# Patient Record
Sex: MALE | Race: WHITE | NOT HISPANIC OR LATINO | Employment: FULL TIME | URBAN - METROPOLITAN AREA
[De-identification: names, ages, dates, MRNs, and addresses within clinical notes are randomized per-mention and may not be internally consistent; named-entity substitution may affect disease eponyms.]

---

## 2017-01-27 RX ORDER — DESVENLAFAXINE 100 MG/1
TABLET, EXTENDED RELEASE ORAL
Qty: 30 TABLET | Refills: 0 | Status: SHIPPED | OUTPATIENT
Start: 2017-01-27 | End: 2017-02-26 | Stop reason: SDUPTHER

## 2017-01-27 RX ORDER — DESVENLAFAXINE SUCCINATE 100 MG/1
TABLET, EXTENDED RELEASE ORAL
Qty: 30 TABLET | Refills: 0 | OUTPATIENT
Start: 2017-01-27

## 2017-02-21 RX ORDER — ATORVASTATIN CALCIUM 10 MG/1
TABLET, FILM COATED ORAL
Qty: 30 TABLET | Refills: 0 | OUTPATIENT
Start: 2017-02-21

## 2017-02-27 RX ORDER — DESVENLAFAXINE SUCCINATE 100 MG/1
TABLET, EXTENDED RELEASE ORAL
Qty: 30 TABLET | Refills: 0 | Status: SHIPPED | OUTPATIENT
Start: 2017-02-27 | End: 2017-03-17 | Stop reason: SDUPTHER

## 2017-03-08 ENCOUNTER — TELEPHONE (OUTPATIENT)
Dept: INTERNAL MEDICINE | Facility: CLINIC | Age: 52
End: 2017-03-08

## 2017-03-08 RX ORDER — ATORVASTATIN CALCIUM 10 MG/1
TABLET, FILM COATED ORAL
Qty: 30 TABLET | Refills: 0 | Status: SHIPPED | OUTPATIENT
Start: 2017-03-08 | End: 2017-03-17 | Stop reason: SDUPTHER

## 2017-03-08 NOTE — TELEPHONE ENCOUNTER
Called pt and schedule a f/u b/c he hasn't been seen in almost 1 year. Pt schedule for 3-17. 30 day supply sent to [facility].

## 2017-03-17 ENCOUNTER — OFFICE VISIT (OUTPATIENT)
Dept: INTERNAL MEDICINE | Facility: CLINIC | Age: 52
End: 2017-03-17

## 2017-03-17 VITALS
RESPIRATION RATE: 18 BRPM | HEART RATE: 86 BPM | DIASTOLIC BLOOD PRESSURE: 78 MMHG | BODY MASS INDEX: 44.77 KG/M2 | SYSTOLIC BLOOD PRESSURE: 118 MMHG | WEIGHT: 312 LBS | TEMPERATURE: 97.3 F

## 2017-03-17 DIAGNOSIS — Z11.59 NEED FOR HEPATITIS C SCREENING TEST: ICD-10-CM

## 2017-03-17 DIAGNOSIS — K21.9 GASTROESOPHAGEAL REFLUX DISEASE WITHOUT ESOPHAGITIS: ICD-10-CM

## 2017-03-17 DIAGNOSIS — E78.5 HYPERLIPIDEMIA, UNSPECIFIED HYPERLIPIDEMIA TYPE: Primary | ICD-10-CM

## 2017-03-17 DIAGNOSIS — F32.A DEPRESSION, UNSPECIFIED DEPRESSION TYPE: ICD-10-CM

## 2017-03-17 DIAGNOSIS — Z13.220 LIPID SCREENING: ICD-10-CM

## 2017-03-17 LAB
ARTICHOKE IGE QN: 91 MG/DL (ref 0–130)
CHOLEST SERPL-MCNC: 188 MG/DL (ref 0–200)
HCV AB SER DONR QL: NORMAL
HDLC SERPL-MCNC: 36 MG/DL (ref 40–60)
TRIGL SERPL-MCNC: 439 MG/DL (ref 0–150)

## 2017-03-17 PROCEDURE — 36415 COLL VENOUS BLD VENIPUNCTURE: CPT | Performed by: INTERNAL MEDICINE

## 2017-03-17 PROCEDURE — 99214 OFFICE O/P EST MOD 30 MIN: CPT | Performed by: INTERNAL MEDICINE

## 2017-03-17 PROCEDURE — 86803 HEPATITIS C AB TEST: CPT | Performed by: INTERNAL MEDICINE

## 2017-03-17 PROCEDURE — 80061 LIPID PANEL: CPT | Performed by: INTERNAL MEDICINE

## 2017-03-17 RX ORDER — ARMODAFINIL 150 MG/1
TABLET ORAL
Refills: 5 | COMMUNITY
Start: 2017-02-04 | End: 2017-03-17 | Stop reason: SDUPTHER

## 2017-03-17 RX ORDER — PANTOPRAZOLE SODIUM 40 MG/1
40 TABLET, DELAYED RELEASE ORAL DAILY
Qty: 30 TABLET | Refills: 8 | Status: SHIPPED | OUTPATIENT
Start: 2017-03-17 | End: 2017-12-27 | Stop reason: SDUPTHER

## 2017-03-17 RX ORDER — DESVENLAFAXINE 100 MG/1
100 TABLET, EXTENDED RELEASE ORAL DAILY
Qty: 30 TABLET | Refills: 8 | Status: SHIPPED | OUTPATIENT
Start: 2017-03-17 | End: 2017-12-27 | Stop reason: SDUPTHER

## 2017-03-17 RX ORDER — ARMODAFINIL 150 MG/1
TABLET ORAL
Qty: 30 TABLET | Refills: 5 | Status: SHIPPED | OUTPATIENT
Start: 2017-03-17 | End: 2017-09-06 | Stop reason: SDUPTHER

## 2017-03-17 RX ORDER — ATORVASTATIN CALCIUM 10 MG/1
TABLET, FILM COATED ORAL
Qty: 30 TABLET | Refills: 8 | Status: SHIPPED | OUTPATIENT
Start: 2017-03-17 | End: 2017-12-27 | Stop reason: SDUPTHER

## 2017-03-17 NOTE — PROGRESS NOTES
Subjective   Hasmukh Leon is a 51 y.o. male.     History of Present Illness     1 hyperlipidemia- chronic and doing well.  Patient says that he has not had any side effects while taking the medication Lipitor.  Patient denies any abdominal pain, jaundice, muscle aches, fatigue, constipation, or any other systemic symptoms.  Diet: Patient has been trying to make some improvements with his diet by eating low fried food, low greasy food, increase fiber.    2 Depression - chronic and controlled.  Patient says that he has been doing very well on the Pristiq 100 mg by mouth once a day.  Patient denies any suicide ideations, emotional liability of threats towards himself or anybody else, no decreased mood, no hallucinations, no other concerns to address at this time.    3 GERD-chronic and controlled.  Patient says that he has been on the Protonix for at least 10+ years.  He says that this is the only thing that helps controlling his reflux and currently he has not had any exacerbations of any reflux issues or symptoms of any nausea, vomiting, diarrhea, anorexia, constipation, or any other systemic symptoms.    Review of Systems   All other systems reviewed and are negative.      Objective   Physical Exam   Constitutional: He appears well-developed and well-nourished.   HENT:   Head: Normocephalic.   Right Ear: External ear normal.   Left Ear: External ear normal.   Nose: Nose normal.   Mouth/Throat: Oropharynx is clear and moist.   Eyes: Conjunctivae and EOM are normal. Pupils are equal, round, and reactive to light.   Neck: Normal range of motion. Neck supple.   Cardiovascular: Normal rate, regular rhythm, normal heart sounds and intact distal pulses.    Pulmonary/Chest: Effort normal and breath sounds normal.   Abdominal: Soft. Bowel sounds are normal.   Musculoskeletal: Normal range of motion.   Neurological: He is alert. He has normal reflexes.   Skin: Skin is warm.   Nursing note and vitals  reviewed.      Assessment/Plan   Hasmukh was seen today for hyperlipidemia, depression and heartburn.    Diagnoses and all orders for this visit:    Hyperlipidemia, unspecified hyperlipidemia type  -     atorvastatin (LIPITOR) 10 MG tablet; TAKE 1 TABLET DAILY.    Depression, unspecified depression type-continue on the Pristiq    Gastroesophageal reflux disease without esophagitis  -     pantoprazole (PROTONIX) 40 MG EC tablet; Take 1 tablet by mouth Daily.        Anticipatory guidance: I did discuss with patient the current medical concerns with PPI medications and kidney disease as well as dementia.  Patients questions were thoroughly answered and he says that the benefit outweighs risk and he would like to continue on the Protonix for his reflux    Need for hepatitis C screening test-hepatitis C antibody screening    Lipid screening    Other orders  -     desvenlafaxine (PRISTIQ) 100 MG 24 hr tablet; Take 1 tablet by mouth Daily.  -     Armodafinil 150 MG tablet; Take one tablet by mouth once a day.

## 2017-03-30 RX ORDER — DESVENLAFAXINE SUCCINATE 100 MG/1
TABLET, EXTENDED RELEASE ORAL
Qty: 30 TABLET | Refills: 0 | Status: SHIPPED | OUTPATIENT
Start: 2017-03-30 | End: 2017-06-22

## 2017-06-22 ENCOUNTER — OFFICE VISIT (OUTPATIENT)
Dept: INTERNAL MEDICINE | Facility: CLINIC | Age: 52
End: 2017-06-22

## 2017-06-22 VITALS
TEMPERATURE: 97.8 F | BODY MASS INDEX: 44.62 KG/M2 | RESPIRATION RATE: 21 BRPM | DIASTOLIC BLOOD PRESSURE: 76 MMHG | HEART RATE: 82 BPM | WEIGHT: 311 LBS | SYSTOLIC BLOOD PRESSURE: 112 MMHG

## 2017-06-22 DIAGNOSIS — Z00.00 WELL ADULT EXAM: ICD-10-CM

## 2017-06-22 DIAGNOSIS — E66.01 MORBID OBESITY DUE TO EXCESS CALORIES (HCC): ICD-10-CM

## 2017-06-22 DIAGNOSIS — M25.521 ELBOW PAIN, RIGHT: Primary | ICD-10-CM

## 2017-06-22 DIAGNOSIS — Z12.5 SCREENING PSA (PROSTATE SPECIFIC ANTIGEN): ICD-10-CM

## 2017-06-22 DIAGNOSIS — Z13.220 LIPID SCREENING: ICD-10-CM

## 2017-06-22 LAB
ALBUMIN SERPL-MCNC: 4.7 G/DL (ref 3.2–4.8)
ALBUMIN/GLOB SERPL: 1.7 G/DL (ref 1.5–2.5)
ALP SERPL-CCNC: 89 U/L (ref 25–100)
ALT SERPL W P-5'-P-CCNC: 36 U/L (ref 7–40)
ANION GAP SERPL CALCULATED.3IONS-SCNC: 3 MMOL/L (ref 3–11)
ARTICHOKE IGE QN: 95 MG/DL (ref 0–130)
AST SERPL-CCNC: 27 U/L (ref 0–33)
BILIRUB SERPL-MCNC: 0.5 MG/DL (ref 0.3–1.2)
BUN BLD-MCNC: 15 MG/DL (ref 9–23)
BUN/CREAT SERPL: 15 (ref 7–25)
CALCIUM SPEC-SCNC: 10 MG/DL (ref 8.7–10.4)
CHLORIDE SERPL-SCNC: 101 MMOL/L (ref 99–109)
CHOLEST SERPL-MCNC: 194 MG/DL (ref 0–200)
CO2 SERPL-SCNC: 35 MMOL/L (ref 20–31)
CREAT BLD-MCNC: 1 MG/DL (ref 0.6–1.3)
DEPRECATED RDW RBC AUTO: 42.7 FL (ref 37–54)
ERYTHROCYTE [DISTWIDTH] IN BLOOD BY AUTOMATED COUNT: 13 % (ref 11.3–14.5)
GFR SERPL CREATININE-BSD FRML MDRD: 79 ML/MIN/1.73
GLOBULIN UR ELPH-MCNC: 2.8 GM/DL
GLUCOSE BLD-MCNC: 107 MG/DL (ref 70–100)
HCT VFR BLD AUTO: 46.9 % (ref 38.9–50.9)
HDLC SERPL-MCNC: 37 MG/DL (ref 40–60)
HGB BLD-MCNC: 15.1 G/DL (ref 13.1–17.5)
MCH RBC QN AUTO: 28.9 PG (ref 27–31)
MCHC RBC AUTO-ENTMCNC: 32.2 G/DL (ref 32–36)
MCV RBC AUTO: 89.8 FL (ref 80–99)
PLATELET # BLD AUTO: 300 10*3/MM3 (ref 150–450)
PMV BLD AUTO: 9.7 FL (ref 6–12)
POTASSIUM BLD-SCNC: 4.4 MMOL/L (ref 3.5–5.5)
PROT SERPL-MCNC: 7.5 G/DL (ref 5.7–8.2)
PSA SERPL-MCNC: 0.33 NG/ML (ref 0–4)
RBC # BLD AUTO: 5.22 10*6/MM3 (ref 4.2–5.76)
SODIUM BLD-SCNC: 139 MMOL/L (ref 132–146)
T4 FREE SERPL-MCNC: 1.05 NG/DL (ref 0.89–1.76)
TRIGL SERPL-MCNC: 282 MG/DL (ref 0–150)
TSH SERPL DL<=0.05 MIU/L-ACNC: 2.08 MIU/ML (ref 0.35–5.35)
WBC NRBC COR # BLD: 5.78 10*3/MM3 (ref 3.5–10.8)

## 2017-06-22 PROCEDURE — 80061 LIPID PANEL: CPT | Performed by: INTERNAL MEDICINE

## 2017-06-22 PROCEDURE — 99396 PREV VISIT EST AGE 40-64: CPT | Performed by: INTERNAL MEDICINE

## 2017-06-22 PROCEDURE — 85027 COMPLETE CBC AUTOMATED: CPT | Performed by: INTERNAL MEDICINE

## 2017-06-22 PROCEDURE — 36415 COLL VENOUS BLD VENIPUNCTURE: CPT | Performed by: INTERNAL MEDICINE

## 2017-06-22 PROCEDURE — 80053 COMPREHEN METABOLIC PANEL: CPT | Performed by: INTERNAL MEDICINE

## 2017-06-22 PROCEDURE — 84153 ASSAY OF PSA TOTAL: CPT | Performed by: INTERNAL MEDICINE

## 2017-06-22 PROCEDURE — 84443 ASSAY THYROID STIM HORMONE: CPT | Performed by: INTERNAL MEDICINE

## 2017-06-22 PROCEDURE — 84439 ASSAY OF FREE THYROXINE: CPT | Performed by: INTERNAL MEDICINE

## 2017-06-22 NOTE — PROGRESS NOTES
Subjective   Hasmukh Leon is a 51 y.o. male.     History of Present Illness     Complete Physical   1 Right elbow- mild pain  Duration  Sx: Mild pain upon minimal manipulation, worse with flexion or extension.  Patient does not recall any particular injury to the right elbow and he says it does not interfere with his activities of daily living and he does not actually have to take any medications for.     2 obesity/weight-patient had questions concerns about diet and exercise and wanted to lose weight    Diet: Poor diet    Exercise low to minimal     Social history: No EtOH consumption, no cigarette smoking, no IV drug use or marijuana.       Review of Systems   All other systems reviewed and are negative.      Objective   Physical Exam   Constitutional: He is oriented to person, place, and time. He appears well-developed and well-nourished.   HENT:   Head: Normocephalic.   Right Ear: External ear normal.   Left Ear: External ear normal.   Nose: Nose normal.   Mouth/Throat: Oropharynx is clear and moist.   Eyes: Conjunctivae and EOM are normal. Pupils are equal, round, and reactive to light.   Neck: Normal range of motion. Neck supple.   Cardiovascular: Normal rate, regular rhythm, normal heart sounds and intact distal pulses.    Pulmonary/Chest: Effort normal and breath sounds normal.   Abdominal: Soft. Bowel sounds are normal.   Genitourinary:   Genitourinary Comments: Patient deferred on digital rectal exam   Musculoskeletal: Normal range of motion. He exhibits tenderness.   Minimal tenderness around the right elbow with flexion and extension   Neurological: He is alert and oriented to person, place, and time. He has normal reflexes.   Skin: Skin is warm.   Psychiatric: He has a normal mood and affect. His behavior is normal. Thought content normal.   Nursing note and vitals reviewed.      Assessment/Plan   Hasmukh was seen today for annual exam and elbow pain.    Diagnoses and all orders for this  visit:    Elbow pain, right-symptoms are consistent with a mild tendinitis.  Recommend passive range of motion exercises, NSAIDs, and considerations for physical therapy.  Patient will continue observation at this time.    Well adult exam  -     CBC (No Diff)  -     Comprehensive Metabolic Panel  -     T4, Free  -     TSH    Lipid screening  -     Lipid Panel    Screening PSA (prostate specific antigen)  -     PSA    Morbid obesity due to excess calories  -     Ambulatory Referral to Nutrition Services    Recommendations:  Appropriate follow-up with ophthalmology for annual eye exam.  Recommend appropriate follow-up with dentist.  Encourage other exercise modalities to help with calorie count

## 2017-06-27 ENCOUNTER — TELEPHONE (OUTPATIENT)
Dept: INTERNAL MEDICINE | Facility: CLINIC | Age: 52
End: 2017-06-27

## 2017-07-21 ENCOUNTER — HOSPITAL ENCOUNTER (OUTPATIENT)
Dept: NUTRITION | Facility: HOSPITAL | Age: 52
Setting detail: RECURRING SERIES
End: 2017-07-21

## 2017-08-07 ENCOUNTER — HOSPITAL ENCOUNTER (OUTPATIENT)
Dept: NUTRITION | Facility: HOSPITAL | Age: 52
Setting detail: RECURRING SERIES
Discharge: HOME | End: 2017-08-07
Attending: INTERNAL MEDICINE

## 2017-08-07 VITALS — WEIGHT: 312.8 LBS | BODY MASS INDEX: 44.78 KG/M2 | HEIGHT: 70 IN

## 2017-08-07 PROCEDURE — 97802 MEDICAL NUTRITION INDIV IN: CPT | Performed by: DIETITIAN, REGISTERED

## 2017-09-06 ENCOUNTER — OFFICE VISIT (OUTPATIENT)
Dept: NEUROLOGY | Facility: CLINIC | Age: 52
End: 2017-09-06

## 2017-09-06 VITALS
SYSTOLIC BLOOD PRESSURE: 136 MMHG | WEIGHT: 315 LBS | DIASTOLIC BLOOD PRESSURE: 84 MMHG | OXYGEN SATURATION: 98 % | BODY MASS INDEX: 45.1 KG/M2 | HEART RATE: 110 BPM | HEIGHT: 70 IN

## 2017-09-06 DIAGNOSIS — G47.33 OSA (OBSTRUCTIVE SLEEP APNEA): Primary | ICD-10-CM

## 2017-09-06 PROCEDURE — 99212 OFFICE O/P EST SF 10 MIN: CPT | Performed by: PSYCHIATRY & NEUROLOGY

## 2017-09-06 RX ORDER — ARMODAFINIL 200 MG/1
TABLET ORAL
Qty: 30 TABLET | Refills: 4 | Status: SHIPPED | OUTPATIENT
Start: 2017-09-06 | End: 2017-10-02 | Stop reason: CLARIF

## 2017-09-06 NOTE — PROGRESS NOTES
"Subjective:     Patient ID: Hasmukh Leon is a 51 y.o. male.    History of Present Illness     51 y.o.  male with AMRITA with EDS returns in follow up.  Last visit on 9/7/16 continued Nuvigil and renewed mask and supplies.    DME:  [facility]    Wt gain of 40 lbs.  Uses Nuvigil on a daily basis and improves daytime sleepiness.    Using CPAP on a daily basis over 7 hours a night.  Persistent daytime fatigue if not using Nuvigil.    Changing mask and supplies on scheduled intervals.    The following portions of the patient's history were reviewed and updated as appropriate: allergies, current medications, past medical history, past surgical history and problem list.    Review of Systems   Constitutional: Positive for fatigue. Negative for activity change and unexpected weight change.   HENT: Negative for facial swelling, hearing loss, tinnitus, trouble swallowing and voice change.    Eyes: Negative for photophobia, pain and visual disturbance.   Respiratory: Negative for apnea, cough and choking.    Cardiovascular: Negative for chest pain.   Gastrointestinal: Negative for constipation, nausea and vomiting.   Endocrine: Negative for cold intolerance.   Genitourinary: Negative for difficulty urinating, frequency and urgency.   Musculoskeletal: Negative for arthralgias, back pain, gait problem, myalgias, neck pain and neck stiffness.   Skin: Negative for rash.   Allergic/Immunologic: Negative for immunocompromised state.   Neurological: Negative for dizziness, tremors, seizures, syncope, facial asymmetry, speech difficulty, weakness, light-headedness, numbness and headaches.   Hematological: Negative for adenopathy.   Psychiatric/Behavioral: Positive for sleep disturbance. Negative for confusion, decreased concentration and hallucinations. The patient is not nervous/anxious.         Objective:  Vitals:    09/06/17 1456   BP: 136/84   Pulse: 110   SpO2: 98%   Weight: (!) 319 lb (145 kg)   Height: 70\" (177.8 cm) "       Neurologic Exam     Mental Status   Attention: normal. Concentration: normal.   Level of consciousness: alert  Knowledge: good and consistent with education.   Normal comprehension.     Cranial Nerves     CN II   Visual fields full to confrontation.   Visual acuity: normal  Right visual field deficit: none  Left visual field deficit: none     CN III, IV, VI   Nystagmus: none   Diplopia: none  Ophthalmoparesis: none  Upgaze: normal  Downgaze: normal  Conjugate gaze: present    CN V   Facial sensation intact.   Right corneal reflex: normal  Left corneal reflex: normal    CN VII   Right facial weakness: none  Left facial weakness: none    CN VIII   Hearing: intact    CN IX, X   Palate: symmetric  Right gag reflex: normal  Left gag reflex: normal    CN XI   Right sternocleidomastoid strength: normal  Left sternocleidomastoid strength: normal    CN XII   Tongue: not atrophic  Fasciculations: absent  Tongue deviation: none    Motor Exam   Muscle bulk: normal  Overall muscle tone: normal  Right arm tone: normal  Left arm tone: normal  Right leg tone: normal  Left leg tone: normal    Sensory Exam   Light touch normal.     Gait, Coordination, and Reflexes     Tremor   Resting tremor: absent  Intention tremor: absent  Action tremor: absent    Reflexes   Reflexes 2+ except as noted.       Physical Exam   Constitutional: He appears well-developed and well-nourished.   Nursing note and vitals reviewed.      Assessment/Plan:       Problems Addressed this Visit        Respiratory    AMRITA (obstructive sleep apnea) - Primary     Continue CPAP   Renew Nuvigil 200 mg qday

## 2017-09-13 ENCOUNTER — TELEPHONE (OUTPATIENT)
Dept: NUTRITION | Facility: HOSPITAL | Age: 52
End: 2017-09-13

## 2017-09-13 NOTE — PROGRESS NOTES
"Spoke with patient on his progress since initial nutrition appointment with RD. Patient states he is doing better with balancing his meals and using the plate method as a guide. Also has been working out a bit more. He has not weighed since the initial appointment but states \"my clothes fit a little better\". Says he has focused a lot of his attention on portion sizes and snacking- he has been eating apples and carrots as snacks in the evening instead of fig newtons. Describes how it is more a matter of emotional eating and just needed something to munch on. States \"I'm making smarter choices\" and that they are \"small, but positive changes\". Plans to continue with these nutrition changes and the exercise he has started. Denies having any questions or concerns as this time.     Goal completion:  1. Portion control: 100%  2. Follow plate method: 50%  3. Lose weight: 100% per patient unknown weight    Has RD contact information, encouraged him to call as needed. Thank you for this referral.   "

## 2017-09-15 ENCOUNTER — HOSPITAL ENCOUNTER (OUTPATIENT)
Dept: NUTRITION | Facility: HOSPITAL | Age: 52
Setting detail: RECURRING SERIES
End: 2017-09-15
Attending: INTERNAL MEDICINE

## 2017-09-25 ENCOUNTER — TELEPHONE (OUTPATIENT)
Dept: NEUROLOGY | Facility: CLINIC | Age: 52
End: 2017-09-25

## 2017-09-25 NOTE — TELEPHONE ENCOUNTER
----- Message from Gabrielle Jefferson MA sent at 9/25/2017  1:27 PM EDT -----  Regarding: RX   Pt needs script for CPAP supplies faxed to [phone].

## 2017-10-02 RX ORDER — MODAFINIL 200 MG/1
200 TABLET ORAL DAILY
Qty: 30 TABLET | Refills: 5 | OUTPATIENT
Start: 2017-10-02 | End: 2018-04-02 | Stop reason: SDUPTHER

## 2017-12-27 DIAGNOSIS — K21.9 GASTROESOPHAGEAL REFLUX DISEASE WITHOUT ESOPHAGITIS: ICD-10-CM

## 2017-12-27 DIAGNOSIS — E78.5 HYPERLIPIDEMIA, UNSPECIFIED HYPERLIPIDEMIA TYPE: ICD-10-CM

## 2017-12-27 RX ORDER — ATORVASTATIN CALCIUM 10 MG/1
TABLET, FILM COATED ORAL
Qty: 30 TABLET | Refills: 0 | Status: SHIPPED | OUTPATIENT
Start: 2017-12-27 | End: 2018-01-24 | Stop reason: SDUPTHER

## 2017-12-27 RX ORDER — DESVENLAFAXINE 100 MG/1
TABLET, EXTENDED RELEASE ORAL
Qty: 30 TABLET | Refills: 0 | Status: SHIPPED | OUTPATIENT
Start: 2017-12-27 | End: 2018-01-24 | Stop reason: SDUPTHER

## 2017-12-27 RX ORDER — PANTOPRAZOLE SODIUM 40 MG/1
TABLET, DELAYED RELEASE ORAL
Qty: 30 TABLET | Refills: 0 | Status: SHIPPED | OUTPATIENT
Start: 2017-12-27 | End: 2018-01-24 | Stop reason: SDUPTHER

## 2018-01-17 ENCOUNTER — TELEPHONE (OUTPATIENT)
Dept: NEUROLOGY | Facility: CLINIC | Age: 53
End: 2018-01-17

## 2018-01-17 NOTE — TELEPHONE ENCOUNTER
----- Message from Soo Garcia sent at 1/17/2018  3:59 PM EST -----  Regarding: SLEEP ORDER  Contact: [phone]  Patient request small travel size cpap and supplies

## 2018-01-18 ENCOUNTER — TELEPHONE (OUTPATIENT)
Dept: NEUROLOGY | Facility: CLINIC | Age: 53
End: 2018-01-18

## 2018-01-18 NOTE — TELEPHONE ENCOUNTER
Called patient and leave message to see where he wanted order for Cpap sent to. Leave message to return my call

## 2018-01-24 DIAGNOSIS — K21.9 GASTROESOPHAGEAL REFLUX DISEASE WITHOUT ESOPHAGITIS: ICD-10-CM

## 2018-01-24 DIAGNOSIS — E78.5 HYPERLIPIDEMIA, UNSPECIFIED HYPERLIPIDEMIA TYPE: ICD-10-CM

## 2018-01-25 RX ORDER — DESVENLAFAXINE 100 MG/1
TABLET, EXTENDED RELEASE ORAL
Qty: 30 TABLET | Refills: 0 | Status: SHIPPED | OUTPATIENT
Start: 2018-01-25 | End: 2018-02-06 | Stop reason: SDUPTHER

## 2018-01-25 RX ORDER — ATORVASTATIN CALCIUM 10 MG/1
TABLET, FILM COATED ORAL
Qty: 30 TABLET | Refills: 0 | Status: SHIPPED | OUTPATIENT
Start: 2018-01-25 | End: 2018-02-25 | Stop reason: SDUPTHER

## 2018-01-25 RX ORDER — PANTOPRAZOLE SODIUM 40 MG/1
TABLET, DELAYED RELEASE ORAL
Qty: 30 TABLET | Refills: 0 | Status: SHIPPED | OUTPATIENT
Start: 2018-01-25 | End: 2018-02-25 | Stop reason: SDUPTHER

## 2018-02-06 ENCOUNTER — TELEPHONE (OUTPATIENT)
Dept: INTERNAL MEDICINE | Facility: CLINIC | Age: 53
End: 2018-02-06

## 2018-02-06 RX ORDER — DESVENLAFAXINE 100 MG/1
100 TABLET, EXTENDED RELEASE ORAL DAILY
Qty: 30 TABLET | Refills: 3 | Status: SHIPPED | OUTPATIENT
Start: 2018-02-06 | End: 2018-04-02 | Stop reason: SDUPTHER

## 2018-02-06 NOTE — TELEPHONE ENCOUNTER
----- Message from Selene Olvera sent at 2/6/2018  3:33 PM EST -----  WIFE-OMEGA RODRÍGUEZSEUMKM-[phone]    PT WAS RECENTLY TRANSFERRED WITH WORK TO [place].  HE LEAVES 6 AM AND GETS HOME 6 PM.  THEY GOT REFILL OF desvenlafaxine (PRISTIQ) 100 MG 24 hr tablet AND IT SAYS HE HAS TO HAVE AN APPT TO GET MORE.  WHAT CAN HE DO?  HE CANT COME TO AN APPT-HE JUST STARTED THIS JOB AND CANT GET OFF    [facility]

## 2018-02-06 NOTE — TELEPHONE ENCOUNTER
Spoke with his wife and she is wondering if Dr Morse will not write his Rx without a follow up or should he go to  or [facility] to see the NP on the weekend as he is off on the weekends.  And could get his rx refilled then     His wife states he went on short term disability in Sept due to anxiety and he has started a new job and is doing much better but cant miss work.

## 2018-02-06 NOTE — TELEPHONE ENCOUNTER
As long as patient is clinically doing well and having no side effects from the Pristiq we can go ahead and call in due to patient's scheduling conflict      If everything is clinically fine with patient as previously stated, go ahead and call in the Pristiq medication      #30      3 refills      And have patient make a follow-up at his earliest convenience.

## 2018-02-25 DIAGNOSIS — K21.9 GASTROESOPHAGEAL REFLUX DISEASE WITHOUT ESOPHAGITIS: ICD-10-CM

## 2018-02-25 DIAGNOSIS — E78.5 HYPERLIPIDEMIA, UNSPECIFIED HYPERLIPIDEMIA TYPE: ICD-10-CM

## 2018-02-26 RX ORDER — DESVENLAFAXINE 100 MG/1
TABLET, EXTENDED RELEASE ORAL
Qty: 30 TABLET | Refills: 0 | OUTPATIENT
Start: 2018-02-26

## 2018-02-26 RX ORDER — PANTOPRAZOLE SODIUM 40 MG/1
TABLET, DELAYED RELEASE ORAL
Qty: 30 TABLET | Refills: 0 | Status: SHIPPED | OUTPATIENT
Start: 2018-02-26 | End: 2018-03-27 | Stop reason: SDUPTHER

## 2018-02-26 RX ORDER — ATORVASTATIN CALCIUM 10 MG/1
TABLET, FILM COATED ORAL
Qty: 30 TABLET | Refills: 0 | Status: SHIPPED | OUTPATIENT
Start: 2018-02-26 | End: 2018-03-27 | Stop reason: SDUPTHER

## 2018-03-27 DIAGNOSIS — K21.9 GASTROESOPHAGEAL REFLUX DISEASE WITHOUT ESOPHAGITIS: ICD-10-CM

## 2018-03-27 DIAGNOSIS — E78.5 HYPERLIPIDEMIA, UNSPECIFIED HYPERLIPIDEMIA TYPE: ICD-10-CM

## 2018-03-27 RX ORDER — PANTOPRAZOLE SODIUM 40 MG/1
TABLET, DELAYED RELEASE ORAL
Qty: 30 TABLET | Refills: 0 | Status: SHIPPED | OUTPATIENT
Start: 2018-03-27 | End: 2018-04-02 | Stop reason: SDUPTHER

## 2018-03-27 RX ORDER — ATORVASTATIN CALCIUM 10 MG/1
TABLET, FILM COATED ORAL
Qty: 30 TABLET | Refills: 0 | Status: SHIPPED | OUTPATIENT
Start: 2018-03-27 | End: 2018-04-02 | Stop reason: SDUPTHER

## 2018-04-02 ENCOUNTER — OFFICE VISIT (OUTPATIENT)
Dept: INTERNAL MEDICINE | Facility: CLINIC | Age: 53
End: 2018-04-02

## 2018-04-02 VITALS
TEMPERATURE: 97.2 F | SYSTOLIC BLOOD PRESSURE: 128 MMHG | BODY MASS INDEX: 45.63 KG/M2 | DIASTOLIC BLOOD PRESSURE: 76 MMHG | WEIGHT: 315 LBS | HEART RATE: 76 BPM | RESPIRATION RATE: 20 BRPM

## 2018-04-02 DIAGNOSIS — E78.01 FAMILIAL HYPERCHOLESTEROLEMIA: ICD-10-CM

## 2018-04-02 DIAGNOSIS — E78.5 HYPERLIPIDEMIA, UNSPECIFIED HYPERLIPIDEMIA TYPE: ICD-10-CM

## 2018-04-02 DIAGNOSIS — F32.A DEPRESSION, UNSPECIFIED DEPRESSION TYPE: ICD-10-CM

## 2018-04-02 DIAGNOSIS — S29.012A UPPER BACK STRAIN, INITIAL ENCOUNTER: Primary | ICD-10-CM

## 2018-04-02 DIAGNOSIS — G47.33 OSA (OBSTRUCTIVE SLEEP APNEA): ICD-10-CM

## 2018-04-02 DIAGNOSIS — K21.9 GASTROESOPHAGEAL REFLUX DISEASE WITHOUT ESOPHAGITIS: ICD-10-CM

## 2018-04-02 LAB
ALBUMIN SERPL-MCNC: 4.5 G/DL (ref 3.2–4.8)
ALBUMIN/GLOB SERPL: 1.9 G/DL (ref 1.5–2.5)
ALP SERPL-CCNC: 85 U/L (ref 25–100)
ALT SERPL W P-5'-P-CCNC: 44 U/L (ref 7–40)
ANION GAP SERPL CALCULATED.3IONS-SCNC: 11 MMOL/L (ref 3–11)
ARTICHOKE IGE QN: 96 MG/DL (ref 0–130)
AST SERPL-CCNC: 28 U/L (ref 0–33)
BILIRUB SERPL-MCNC: 0.5 MG/DL (ref 0.3–1.2)
BUN BLD-MCNC: 16 MG/DL (ref 9–23)
BUN/CREAT SERPL: 16 (ref 7–25)
CALCIUM SPEC-SCNC: 9.5 MG/DL (ref 8.7–10.4)
CHLORIDE SERPL-SCNC: 98 MMOL/L (ref 99–109)
CHOLEST SERPL-MCNC: 191 MG/DL (ref 0–200)
CO2 SERPL-SCNC: 29 MMOL/L (ref 20–31)
CREAT BLD-MCNC: 1 MG/DL (ref 0.6–1.3)
DEPRECATED RDW RBC AUTO: 41.2 FL (ref 37–54)
ERYTHROCYTE [DISTWIDTH] IN BLOOD BY AUTOMATED COUNT: 12.8 % (ref 11.3–14.5)
GFR SERPL CREATININE-BSD FRML MDRD: 78 ML/MIN/1.73
GLOBULIN UR ELPH-MCNC: 2.4 GM/DL
GLUCOSE BLD-MCNC: 102 MG/DL (ref 70–100)
HCT VFR BLD AUTO: 44.2 % (ref 38.9–50.9)
HDLC SERPL-MCNC: 41 MG/DL (ref 40–60)
HGB BLD-MCNC: 14.7 G/DL (ref 13.1–17.5)
MCH RBC QN AUTO: 29.3 PG (ref 27–31)
MCHC RBC AUTO-ENTMCNC: 33.3 G/DL (ref 32–36)
MCV RBC AUTO: 88.2 FL (ref 80–99)
PLATELET # BLD AUTO: 293 10*3/MM3 (ref 150–450)
PMV BLD AUTO: 9.9 FL (ref 6–12)
POTASSIUM BLD-SCNC: 4.4 MMOL/L (ref 3.5–5.5)
PROT SERPL-MCNC: 6.9 G/DL (ref 5.7–8.2)
RBC # BLD AUTO: 5.01 10*6/MM3 (ref 4.2–5.76)
SODIUM BLD-SCNC: 138 MMOL/L (ref 132–146)
T4 FREE SERPL-MCNC: 1.14 NG/DL (ref 0.89–1.76)
TRIGL SERPL-MCNC: 306 MG/DL (ref 0–150)
TSH SERPL DL<=0.05 MIU/L-ACNC: 2.58 MIU/ML (ref 0.35–5.35)
WBC NRBC COR # BLD: 6.43 10*3/MM3 (ref 3.5–10.8)

## 2018-04-02 PROCEDURE — 36415 COLL VENOUS BLD VENIPUNCTURE: CPT | Performed by: INTERNAL MEDICINE

## 2018-04-02 PROCEDURE — 99214 OFFICE O/P EST MOD 30 MIN: CPT | Performed by: INTERNAL MEDICINE

## 2018-04-02 PROCEDURE — 84443 ASSAY THYROID STIM HORMONE: CPT | Performed by: INTERNAL MEDICINE

## 2018-04-02 PROCEDURE — 80053 COMPREHEN METABOLIC PANEL: CPT | Performed by: INTERNAL MEDICINE

## 2018-04-02 PROCEDURE — 84439 ASSAY OF FREE THYROXINE: CPT | Performed by: INTERNAL MEDICINE

## 2018-04-02 PROCEDURE — 80061 LIPID PANEL: CPT | Performed by: INTERNAL MEDICINE

## 2018-04-02 PROCEDURE — 85027 COMPLETE CBC AUTOMATED: CPT | Performed by: INTERNAL MEDICINE

## 2018-04-02 RX ORDER — PANTOPRAZOLE SODIUM 40 MG/1
40 TABLET, DELAYED RELEASE ORAL DAILY
Qty: 90 TABLET | Refills: 3 | Status: SHIPPED | OUTPATIENT
Start: 2018-04-02 | End: 2019-04-18 | Stop reason: SDUPTHER

## 2018-04-02 RX ORDER — MODAFINIL 200 MG/1
200 TABLET ORAL DAILY
Qty: 30 TABLET | Refills: 5 | Status: SHIPPED | OUTPATIENT
Start: 2018-04-02 | End: 2018-10-17 | Stop reason: SDUPTHER

## 2018-04-02 RX ORDER — DESVENLAFAXINE 100 MG/1
100 TABLET, EXTENDED RELEASE ORAL DAILY
Qty: 90 TABLET | Refills: 3 | Status: SHIPPED | OUTPATIENT
Start: 2018-04-02 | End: 2019-04-18 | Stop reason: SDUPTHER

## 2018-04-02 RX ORDER — ATORVASTATIN CALCIUM 10 MG/1
10 TABLET, FILM COATED ORAL DAILY
Qty: 90 TABLET | Refills: 3 | Status: SHIPPED | OUTPATIENT
Start: 2018-04-02 | End: 2019-04-18 | Stop reason: SDUPTHER

## 2018-04-02 NOTE — PROGRESS NOTES
Subjective   Hasmukh Leno is a 52 y.o. male.     History of Present Illness     Mid upper back pain (new issue)  Duration 2 months  Sx patient says that he was washing his hair and reach up above his head and felt a strain in his upper back .  Patient says that he periodically experiences muscle tightness in this upper area and it prevents him from reaching overhead or transferring.    2 hyperlipidemia-chronic and controlled.  Patient continues on Lipitor 10 mg by mouth once a day and he has not had any side effects.  Patient denies any muscle weakness, abdominal pain, constipation, or any other systemic symptoms.    3 GERD- chronic and controlled.  Patient says that the Protonix 40 mg by mouth once a day has continued to do well with controlling his reflux symptoms.  No abdominal pain, no nausea, vomiting    4 Depression -chronic and controlled.  Patient continues on the Pristiq 100 mg by mouth once a day and this has been doing very well with controlling his emotions.  Patient denies any suicide ideations, emotional liability of threats towards himself or anybody else, no hallucinations, no other systemic symptoms at this time.        Review of Systems   All other systems reviewed and are negative.      Objective   Physical Exam   Constitutional: He appears well-developed and well-nourished.   HENT:   Head: Normocephalic.   Right Ear: External ear normal.   Left Ear: External ear normal.   Mouth/Throat: Oropharynx is clear and moist.   Eyes: Conjunctivae and EOM are normal. Pupils are equal, round, and reactive to light.   Neck: Normal range of motion.   Cardiovascular: Normal rate, regular rhythm, normal heart sounds and intact distal pulses.    Pulmonary/Chest: Effort normal and breath sounds normal.   Musculoskeletal: Normal range of motion.   Mild soreness and tenderness to upper back, good range of motion   Skin: Skin is warm.   Nursing note and vitals reviewed.      Assessment/Plan   Hasmukh was seen today  for follow-up, back pain, hyperlipidemia, heartburn and depression.    Diagnoses and all orders for this visit:    Upper back strain, initial encounter  Recommend physical therapy.  Continue NSAIDs as needed.  Activity as tolerated    Familial hypercholesterolemia  -     Lipid Panel    Gastroesophageal reflux disease without esophagitis  -     pantoprazole (PROTONIX) 40 MG EC tablet; Take 1 tablet by mouth Daily.    Depression, unspecified depression type  -     desvenlafaxine (PRISTIQ) 100 MG 24 hr tablet; Take 1 tablet by mouth Daily.  -     CBC (No Diff)  -     Comprehensive Metabolic Panel  -     T4, Free  -     TSH    AMRITA (obstructive sleep apnea)  -     modafinil (PROVIGIL) 200 MG tablet; Take 1 tablet by mouth Daily.    Hyperlipidemia, unspecified hyperlipidemia type  -     atorvastatin (LIPITOR) 10 MG tablet; Take 1 tablet by mouth Daily.

## 2018-04-03 ENCOUNTER — TELEPHONE (OUTPATIENT)
Dept: INTERNAL MEDICINE | Facility: CLINIC | Age: 53
End: 2018-04-03

## 2018-04-03 NOTE — TELEPHONE ENCOUNTER
----- Message from Selene Olvera sent at 4/3/2018 10:28 AM EDT -----  IRAM-PHARMACIST-[facility] OGGHV-[phone]    THEY NEED ICD 10 CODE FOR MODAFINIL

## 2018-04-03 NOTE — TELEPHONE ENCOUNTER
On rx it says: Diagnosis Association: AMRITA (obstructive sleep apnea) (G47.33). Pharm informed. Verb good understanding

## 2018-04-25 DIAGNOSIS — E78.5 HYPERLIPIDEMIA, UNSPECIFIED HYPERLIPIDEMIA TYPE: ICD-10-CM

## 2018-04-25 DIAGNOSIS — K21.9 GASTROESOPHAGEAL REFLUX DISEASE WITHOUT ESOPHAGITIS: ICD-10-CM

## 2018-04-25 RX ORDER — PANTOPRAZOLE SODIUM 40 MG/1
TABLET, DELAYED RELEASE ORAL
Qty: 30 TABLET | Refills: 0 | OUTPATIENT
Start: 2018-04-25

## 2018-04-25 RX ORDER — ATORVASTATIN CALCIUM 10 MG/1
TABLET, FILM COATED ORAL
Qty: 30 TABLET | Refills: 0 | OUTPATIENT
Start: 2018-04-25

## 2018-09-10 ENCOUNTER — OFFICE VISIT (OUTPATIENT)
Dept: NEUROLOGY | Facility: CLINIC | Age: 53
End: 2018-09-10

## 2018-09-10 VITALS
OXYGEN SATURATION: 98 % | WEIGHT: 315 LBS | SYSTOLIC BLOOD PRESSURE: 126 MMHG | DIASTOLIC BLOOD PRESSURE: 84 MMHG | HEIGHT: 70 IN | HEART RATE: 87 BPM | BODY MASS INDEX: 45.1 KG/M2 | RESPIRATION RATE: 18 BRPM

## 2018-09-10 DIAGNOSIS — G47.33 OSA (OBSTRUCTIVE SLEEP APNEA): Primary | ICD-10-CM

## 2018-09-10 DIAGNOSIS — G47.19 EXCESSIVE DAYTIME SLEEPINESS: ICD-10-CM

## 2018-09-10 PROCEDURE — 99213 OFFICE O/P EST LOW 20 MIN: CPT | Performed by: PSYCHIATRY & NEUROLOGY

## 2018-09-10 NOTE — PROGRESS NOTES
Subjective:   Chief Complaint   Patient presents with   • Sleep Apnea       Patient ID: Hasmukh Leon is a 52 y.o. male.    History of Present Illness     52 y.o. male with AMRITA with EDS returns in follow up.  Last visit on 9/6/17 continued Nuvigil and renewed mask and supplies.    DME:  [facility]    Takes Provigil 1/2 in am and 1/2 in pm.  Alert during the day with generic provigil.      Using CPAP 12 cm H20 on a daily basis over 7 - 8 hours a night, would like to sleep 9 - 10 hours a night..  Persistent daytime fatigue if not using Provigil.      Changing mask and supplies on scheduled intervals.    Machine is now 5 years old and requesting new machine.   The following portions of the patient's history were reviewed and updated as appropriate: allergies, current medications, past medical history, past surgical history and problem list.    Review of Systems   Constitutional: Positive for fatigue. Negative for activity change and unexpected weight change.   HENT: Negative for facial swelling, hearing loss, tinnitus, trouble swallowing and voice change.    Eyes: Negative for photophobia, pain and visual disturbance.   Respiratory: Negative for apnea, cough and choking.    Cardiovascular: Negative for chest pain.   Gastrointestinal: Negative for constipation, nausea and vomiting.   Endocrine: Negative for cold intolerance.   Genitourinary: Negative for difficulty urinating, frequency and urgency.   Musculoskeletal: Negative for arthralgias, back pain, gait problem, myalgias, neck pain and neck stiffness.   Skin: Negative for rash.   Allergic/Immunologic: Negative for immunocompromised state.   Neurological: Negative for dizziness, tremors, seizures, syncope, facial asymmetry, speech difficulty, weakness, light-headedness, numbness and headaches.   Hematological: Negative for adenopathy.   Psychiatric/Behavioral: Positive for sleep disturbance. Negative for confusion, decreased concentration and hallucinations.  "The patient is not nervous/anxious.         Objective:  Vitals:    09/10/18 1441   BP: 126/84   BP Location: Left arm   Patient Position: Sitting   Cuff Size: Adult   Pulse: 87   Resp: 18   SpO2: 98%   Weight: (!) 145 kg (319 lb)   Height: 177.8 cm (70\")       Neurologic Exam     Mental Status   Attention: normal. Concentration: normal.   Level of consciousness: alert  Knowledge: good and consistent with education.   Normal comprehension.     Cranial Nerves     CN II   Visual fields full to confrontation.   Visual acuity: normal  Right visual field deficit: none  Left visual field deficit: none     CN III, IV, VI   Nystagmus: none   Diplopia: none  Ophthalmoparesis: none  Upgaze: normal  Downgaze: normal  Conjugate gaze: present    CN V   Facial sensation intact.   Right corneal reflex: normal  Left corneal reflex: normal    CN VII   Right facial weakness: none  Left facial weakness: none    CN VIII   Hearing: intact    CN IX, X   Palate: symmetric  Right gag reflex: normal  Left gag reflex: normal    CN XI   Right sternocleidomastoid strength: normal  Left sternocleidomastoid strength: normal    CN XII   Tongue: not atrophic  Fasciculations: absent  Tongue deviation: none    Motor Exam   Muscle bulk: normal  Overall muscle tone: normal  Right arm tone: normal  Left arm tone: normal  Right leg tone: normal  Left leg tone: normal    Sensory Exam   Light touch normal.     Gait, Coordination, and Reflexes     Tremor   Resting tremor: absent  Intention tremor: absent  Action tremor: absent    Reflexes   Reflexes 2+ except as noted.       Physical Exam   Constitutional: He appears well-developed and well-nourished.   Nursing note and vitals reviewed.      Assessment/Plan:       Problems Addressed this Visit        Respiratory    AMRITA (obstructive sleep apnea) - Primary     Pt requesting new machine    Order sent to [facility] CPAP 12 cm H20              Other    Excessive daytime sleepiness     With AMRITA    Continue " Provigil 200 mg qam

## 2018-10-17 DIAGNOSIS — G47.33 OSA (OBSTRUCTIVE SLEEP APNEA): ICD-10-CM

## 2018-10-18 RX ORDER — MODAFINIL 200 MG/1
200 TABLET ORAL DAILY
Qty: 30 TABLET | Refills: 2 | Status: SHIPPED | OUTPATIENT
Start: 2018-10-18 | End: 2019-05-31 | Stop reason: SDUPTHER

## 2019-04-18 DIAGNOSIS — F32.A DEPRESSION, UNSPECIFIED DEPRESSION TYPE: ICD-10-CM

## 2019-04-18 DIAGNOSIS — K21.9 GASTROESOPHAGEAL REFLUX DISEASE WITHOUT ESOPHAGITIS: ICD-10-CM

## 2019-04-18 DIAGNOSIS — E78.5 HYPERLIPIDEMIA, UNSPECIFIED HYPERLIPIDEMIA TYPE: ICD-10-CM

## 2019-04-18 RX ORDER — PANTOPRAZOLE SODIUM 40 MG/1
40 TABLET, DELAYED RELEASE ORAL DAILY
Qty: 90 TABLET | Refills: 0 | Status: SHIPPED | OUTPATIENT
Start: 2019-04-18 | End: 2019-05-31 | Stop reason: SDUPTHER

## 2019-04-18 RX ORDER — ATORVASTATIN CALCIUM 10 MG/1
10 TABLET, FILM COATED ORAL DAILY
Qty: 90 TABLET | Refills: 0 | Status: SHIPPED | OUTPATIENT
Start: 2019-04-18 | End: 2019-05-31 | Stop reason: SDUPTHER

## 2019-04-18 RX ORDER — DESVENLAFAXINE 100 MG/1
100 TABLET, EXTENDED RELEASE ORAL DAILY
Qty: 90 TABLET | Refills: 0 | Status: SHIPPED | OUTPATIENT
Start: 2019-04-18 | End: 2019-05-31 | Stop reason: SDUPTHER

## 2019-05-31 ENCOUNTER — OFFICE VISIT (OUTPATIENT)
Dept: INTERNAL MEDICINE | Facility: CLINIC | Age: 54
End: 2019-05-31

## 2019-05-31 VITALS
HEART RATE: 80 BPM | RESPIRATION RATE: 20 BRPM | DIASTOLIC BLOOD PRESSURE: 82 MMHG | HEIGHT: 70 IN | SYSTOLIC BLOOD PRESSURE: 130 MMHG | BODY MASS INDEX: 44.58 KG/M2 | WEIGHT: 311.38 LBS | TEMPERATURE: 97.8 F

## 2019-05-31 DIAGNOSIS — K21.9 GASTROESOPHAGEAL REFLUX DISEASE WITHOUT ESOPHAGITIS: ICD-10-CM

## 2019-05-31 DIAGNOSIS — M54.50 CHRONIC BILATERAL LOW BACK PAIN WITHOUT SCIATICA: ICD-10-CM

## 2019-05-31 DIAGNOSIS — G47.33 OSA (OBSTRUCTIVE SLEEP APNEA): ICD-10-CM

## 2019-05-31 DIAGNOSIS — Z13.220 LIPID SCREENING: ICD-10-CM

## 2019-05-31 DIAGNOSIS — F32.A DEPRESSION, UNSPECIFIED DEPRESSION TYPE: ICD-10-CM

## 2019-05-31 DIAGNOSIS — G89.29 CHRONIC BILATERAL LOW BACK PAIN WITHOUT SCIATICA: ICD-10-CM

## 2019-05-31 DIAGNOSIS — L60.8 DEFORMITY OF NAIL BED: ICD-10-CM

## 2019-05-31 DIAGNOSIS — E78.5 HYPERLIPIDEMIA, UNSPECIFIED HYPERLIPIDEMIA TYPE: ICD-10-CM

## 2019-05-31 DIAGNOSIS — Z12.5 SCREENING PSA (PROSTATE SPECIFIC ANTIGEN): ICD-10-CM

## 2019-05-31 DIAGNOSIS — H93.13 TINNITUS OF BOTH EARS: ICD-10-CM

## 2019-05-31 DIAGNOSIS — Z00.00 WELL ADULT EXAM: Primary | ICD-10-CM

## 2019-05-31 DIAGNOSIS — M25.561 ACUTE PAIN OF RIGHT KNEE: ICD-10-CM

## 2019-05-31 LAB
ALBUMIN SERPL-MCNC: 4.4 G/DL (ref 3.5–5.2)
ALBUMIN/GLOB SERPL: 1.6 G/DL
ALP SERPL-CCNC: 77 U/L (ref 39–117)
ALT SERPL W P-5'-P-CCNC: 35 U/L (ref 1–41)
ANION GAP SERPL CALCULATED.3IONS-SCNC: 12.3 MMOL/L
AST SERPL-CCNC: 28 U/L (ref 1–40)
BILIRUB SERPL-MCNC: 0.3 MG/DL (ref 0.2–1.2)
BUN BLD-MCNC: 14 MG/DL (ref 6–20)
BUN/CREAT SERPL: 14.6 (ref 7–25)
CALCIUM SPEC-SCNC: 9.4 MG/DL (ref 8.6–10.5)
CHLORIDE SERPL-SCNC: 100 MMOL/L (ref 98–107)
CHOLEST SERPL-MCNC: 170 MG/DL (ref 0–200)
CO2 SERPL-SCNC: 27.7 MMOL/L (ref 22–29)
CREAT BLD-MCNC: 0.96 MG/DL (ref 0.76–1.27)
DEPRECATED RDW RBC AUTO: 40.6 FL (ref 37–54)
ERYTHROCYTE [DISTWIDTH] IN BLOOD BY AUTOMATED COUNT: 12.4 % (ref 12.3–15.4)
GFR SERPL CREATININE-BSD FRML MDRD: 82 ML/MIN/1.73
GLOBULIN UR ELPH-MCNC: 2.8 GM/DL
GLUCOSE BLD-MCNC: 100 MG/DL (ref 65–99)
HCT VFR BLD AUTO: 46.8 % (ref 37.5–51)
HDLC SERPL-MCNC: 34 MG/DL (ref 40–60)
HGB BLD-MCNC: 15.2 G/DL (ref 13–17.7)
LDLC SERPL CALC-MCNC: 93 MG/DL (ref 0–100)
LDLC/HDLC SERPL: 2.75 {RATIO}
MCH RBC QN AUTO: 29.1 PG (ref 26.6–33)
MCHC RBC AUTO-ENTMCNC: 32.5 G/DL (ref 31.5–35.7)
MCV RBC AUTO: 89.7 FL (ref 79–97)
PLATELET # BLD AUTO: 301 10*3/MM3 (ref 140–450)
PMV BLD AUTO: 10.9 FL (ref 6–12)
POTASSIUM BLD-SCNC: 4.3 MMOL/L (ref 3.5–5.2)
PROT SERPL-MCNC: 7.2 G/DL (ref 6–8.5)
PSA SERPL-MCNC: 0.41 NG/ML (ref 0–4)
RBC # BLD AUTO: 5.22 10*6/MM3 (ref 4.14–5.8)
SODIUM BLD-SCNC: 140 MMOL/L (ref 136–145)
T4 FREE SERPL-MCNC: 1.15 NG/DL (ref 0.93–1.7)
TRIGL SERPL-MCNC: 213 MG/DL (ref 0–150)
TSH SERPL DL<=0.05 MIU/L-ACNC: 2.91 MIU/ML (ref 0.27–4.2)
VLDLC SERPL-MCNC: 42.6 MG/DL (ref 5–40)
WBC NRBC COR # BLD: 5.33 10*3/MM3 (ref 3.4–10.8)

## 2019-05-31 PROCEDURE — 80061 LIPID PANEL: CPT | Performed by: INTERNAL MEDICINE

## 2019-05-31 PROCEDURE — 85027 COMPLETE CBC AUTOMATED: CPT | Performed by: INTERNAL MEDICINE

## 2019-05-31 PROCEDURE — G0103 PSA SCREENING: HCPCS | Performed by: INTERNAL MEDICINE

## 2019-05-31 PROCEDURE — 80053 COMPREHEN METABOLIC PANEL: CPT | Performed by: INTERNAL MEDICINE

## 2019-05-31 PROCEDURE — 99396 PREV VISIT EST AGE 40-64: CPT | Performed by: INTERNAL MEDICINE

## 2019-05-31 PROCEDURE — 36415 COLL VENOUS BLD VENIPUNCTURE: CPT | Performed by: INTERNAL MEDICINE

## 2019-05-31 PROCEDURE — 84439 ASSAY OF FREE THYROXINE: CPT | Performed by: INTERNAL MEDICINE

## 2019-05-31 PROCEDURE — 84443 ASSAY THYROID STIM HORMONE: CPT | Performed by: INTERNAL MEDICINE

## 2019-05-31 RX ORDER — PANTOPRAZOLE SODIUM 40 MG/1
40 TABLET, DELAYED RELEASE ORAL DAILY
Qty: 90 TABLET | Refills: 1 | Status: SHIPPED | OUTPATIENT
Start: 2019-05-31 | End: 2019-11-19 | Stop reason: SDUPTHER

## 2019-05-31 RX ORDER — MODAFINIL 200 MG/1
200 TABLET ORAL DAILY
Qty: 30 TABLET | Refills: 2 | Status: SHIPPED | OUTPATIENT
Start: 2019-05-31 | End: 2019-10-03 | Stop reason: SDUPTHER

## 2019-05-31 RX ORDER — DESVENLAFAXINE 100 MG/1
100 TABLET, EXTENDED RELEASE ORAL DAILY
Qty: 90 TABLET | Refills: 2 | Status: SHIPPED | OUTPATIENT
Start: 2019-05-31 | End: 2019-11-19 | Stop reason: SDUPTHER

## 2019-05-31 RX ORDER — ATORVASTATIN CALCIUM 10 MG/1
10 TABLET, FILM COATED ORAL DAILY
Qty: 90 TABLET | Refills: 2 | Status: SHIPPED | OUTPATIENT
Start: 2019-05-31 | End: 2019-11-19 | Stop reason: SDUPTHER

## 2019-05-31 NOTE — PROGRESS NOTES
"Tim Leon is a 53 y.o. male.     History of Present Illness     The following portions of the patient's history were reviewed and updated as appropriate: allergies, current medications, past family history, past medical history, past social history, past surgical history and problem list.        Complete Adult Physical    1 right knee pain  Duration 2 to 3 months, sometimes may be even longer  sx she says that he has been exercising more but he has noticed that he has been experiencing pain with his right knee.  Pain is made worse with flexion, extension, or use of the elliptical machine.  Patient denies any fever, chills, nausea, vomiting, or any other systemic symptoms.  He has had intermittent swelling of the right knee and it does respond intermittently with NSAIDs.  Patient denies any limp, abnormal gait,    2 finger nail on left hand  Duration   Patient notice a disfiguring of the nailbed.  Patient says that he denies any particular trauma to the nailbed but says that he has to cut around the disfigured nail and is concerned and wanted to bring it up on today's visit.    3 ringing of ears or tone   Duration: Greater than 6 months.  Patient says that he has been noticing a small monotone \"ring\" to his hearing especially when he is meditating and the room is entirely quiet.  Patient denies any unusual headaches, visual problems, numbness or tingling in the lower upper extremities, no dizziness, no syncope, no other systemic symptoms.  He also denies any particular trauma to the head neck region.  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    4  low back pain.  Patient has been having chronic low back pain to which he has been using physical therapy to help relieve his discomfort.  Pain is made worse with flexion, extension, or rotational movement around the lumbar region.  Recently, he has been using " massage therapy and this has worked wonders with his lower back and improved his overall range of motion and ability to perform activities of daily living.  This also has increased his improvement with overall quality of life.        Diet: Regular diet, trying to decrease on portions        Exercise: Uses the elliptical machine at least 2-3 times a week        Social History: No EtOH consumption, no IV drug use, no marijuana, no illicit drugs.        Preventative Screenings  Colonoscopy- 2 years ago normal       Immunizations:            Review of Systems   All other systems reviewed and are negative.      Objective   Physical Exam   Constitutional: He appears well-developed and well-nourished.   HENT:   Head: Normocephalic.   Right Ear: External ear normal.   Left Ear: External ear normal.   Nose: Nose normal.   Mouth/Throat: Oropharynx is clear and moist.   Eyes: Conjunctivae and EOM are normal. Pupils are equal, round, and reactive to light.   Neck: Normal range of motion. Neck supple.   Cardiovascular: Normal rate, regular rhythm and normal heart sounds.   Pulmonary/Chest: Effort normal and breath sounds normal.   Abdominal: Soft. Bowel sounds are normal.   Musculoskeletal: Normal range of motion.   Neurological: He is alert.   Skin: Skin is warm.   Disfiguring of left nail bed   Psychiatric: He has a normal mood and affect. His behavior is normal. Judgment and thought content normal.   Nursing note and vitals reviewed.        Assessment/Plan     Right knee pain  Continue with NSAIDs as needed  Referral to physical therapy for further management and evaluation    2 disfiguration of the finger- recommend referral to dermatology for further evaluation and management.    3 low back pain-chronic, continue with the NSAIDs as needed, recommend massage therapy for    Anticipatory guidance:  Recommend routine ophthalmology screening.  Recommend routine dental visit screening.

## 2019-06-12 ENCOUNTER — TELEPHONE (OUTPATIENT)
Dept: INTERNAL MEDICINE | Facility: CLINIC | Age: 54
End: 2019-06-12

## 2019-06-14 ENCOUNTER — TELEPHONE (OUTPATIENT)
Dept: INTERNAL MEDICINE | Facility: CLINIC | Age: 54
End: 2019-06-14

## 2019-06-14 DIAGNOSIS — M25.561 ACUTE PAIN OF RIGHT KNEE: Primary | ICD-10-CM

## 2019-06-14 DIAGNOSIS — M54.50 CHRONIC BILATERAL LOW BACK PAIN WITHOUT SCIATICA: ICD-10-CM

## 2019-06-14 DIAGNOSIS — S29.012A UPPER BACK STRAIN, INITIAL ENCOUNTER: ICD-10-CM

## 2019-06-14 DIAGNOSIS — G89.29 CHRONIC BILATERAL LOW BACK PAIN WITHOUT SCIATICA: ICD-10-CM

## 2019-06-14 NOTE — TELEPHONE ENCOUNTER
Regarding: Non-Urgent Medical Question  Contact: [phone]  ----- Message from Xi Saldana MA sent at 6/10/2019  8:34 AM EDT -----       ----- Message from Hasmukh Leon to Felipe Morse MD sent at 6/8/2019  9:13 AM -----   Inderjit Morse,  At my recent visit we discussed a referral for my knee pain and also referral to my massage therapist.  I've not heard back on either of these yet.  For my knee, I'll need a referral to someone in [place] where I work.  I can work with your referral specialist on the specifics.  For my massage therapist, I just need a document indicating that you are referring me to [facility] therapist Ailyn Maya at [address] [place] [address] [place]. [place]. [address].  Phone [phone].  This is to address my back issues that we spoke about in early 2018.  You referred me to a physical therapist back then and afterwards I continued therapy with [facility].   It was nice to see you.  Thank you.

## 2019-06-20 NOTE — TELEPHONE ENCOUNTER
LVM for pt at [phone]-need to discuss where he wants to go in [place] for Ortho-and if he wants to call ins to see if there is a prov in network    Also need to know what he needs done with the massage referral-if he needs an appt or the referral faxed to [facility]

## 2019-07-19 DIAGNOSIS — K21.9 GASTROESOPHAGEAL REFLUX DISEASE WITHOUT ESOPHAGITIS: ICD-10-CM

## 2019-07-19 DIAGNOSIS — E78.5 HYPERLIPIDEMIA, UNSPECIFIED HYPERLIPIDEMIA TYPE: ICD-10-CM

## 2019-07-19 DIAGNOSIS — F32.A DEPRESSION, UNSPECIFIED DEPRESSION TYPE: ICD-10-CM

## 2019-07-19 RX ORDER — PANTOPRAZOLE SODIUM 40 MG/1
40 TABLET, DELAYED RELEASE ORAL DAILY
Qty: 90 TABLET | Refills: 0 | OUTPATIENT
Start: 2019-07-19

## 2019-07-19 RX ORDER — ATORVASTATIN CALCIUM 10 MG/1
10 TABLET, FILM COATED ORAL DAILY
Qty: 90 TABLET | Refills: 0 | OUTPATIENT
Start: 2019-07-19

## 2019-07-19 RX ORDER — DESVENLAFAXINE 100 MG/1
100 TABLET, EXTENDED RELEASE ORAL DAILY
Qty: 90 TABLET | Refills: 0 | OUTPATIENT
Start: 2019-07-19

## 2019-09-09 ENCOUNTER — OFFICE VISIT (OUTPATIENT)
Dept: NEUROLOGY | Facility: CLINIC | Age: 54
End: 2019-09-09

## 2019-09-09 VITALS
OXYGEN SATURATION: 98 % | BODY MASS INDEX: 45.1 KG/M2 | SYSTOLIC BLOOD PRESSURE: 122 MMHG | RESPIRATION RATE: 18 BRPM | WEIGHT: 315 LBS | HEART RATE: 91 BPM | DIASTOLIC BLOOD PRESSURE: 80 MMHG | HEIGHT: 70 IN

## 2019-09-09 DIAGNOSIS — G47.33 OSA (OBSTRUCTIVE SLEEP APNEA): Primary | ICD-10-CM

## 2019-09-09 PROCEDURE — 99212 OFFICE O/P EST SF 10 MIN: CPT | Performed by: PSYCHIATRY & NEUROLOGY

## 2019-09-09 NOTE — PROGRESS NOTES
Subjective:   Chief Complaint   Patient presents with   • Sleep Apnea       Patient ID: Hasmukh Leon is a 53 y.o. male.    History of Present Illness     53 y.o. male with AMRITA with EDS returns in follow up.  Last visit on 9/10/18 continued Nuvigil and renewed mask and supplies.    DME:  [facility]    Takes Provigil 200 mg qpm.  Does not have travel sleeping at night.        Using CPAP 12 cm H20 every time he sleeps. would like to sleep 9 - 10 hours a night..  Persistent daytime fatigue if not using Provigil.      Changing mask and supplies on scheduled intervals.       The following portions of the patient's history were reviewed and updated as appropriate: allergies, current medications, past medical history, past surgical history and problem list.    Review of Systems   Constitutional: Positive for fatigue. Negative for activity change and unexpected weight change.   HENT: Negative for facial swelling, hearing loss, tinnitus, trouble swallowing and voice change.    Eyes: Negative for photophobia, pain and visual disturbance.   Respiratory: Negative for apnea, cough and choking.    Cardiovascular: Negative for chest pain.   Gastrointestinal: Negative for constipation, nausea and vomiting.   Endocrine: Negative for cold intolerance.   Genitourinary: Negative for difficulty urinating, frequency and urgency.   Musculoskeletal: Negative for arthralgias, back pain, gait problem, myalgias, neck pain and neck stiffness.   Skin: Negative for rash.   Allergic/Immunologic: Negative for immunocompromised state.   Neurological: Negative for dizziness, tremors, seizures, syncope, facial asymmetry, speech difficulty, weakness, light-headedness, numbness and headaches.   Hematological: Negative for adenopathy.   Psychiatric/Behavioral: Positive for sleep disturbance. Negative for confusion, decreased concentration and hallucinations. The patient is not nervous/anxious.         Objective:  Vitals:    09/09/19 1446   BP:  "122/80   BP Location: Left arm   Patient Position: Sitting   Cuff Size: Adult   Pulse: 91   Resp: 18   SpO2: 98%   Weight: (!) 143 kg (315 lb)   Height: 177.8 cm (70\")       Neurologic Exam     Mental Status   Attention: normal. Concentration: normal.   Level of consciousness: alert  Knowledge: good and consistent with education.   Normal comprehension.     Cranial Nerves     CN II   Visual fields full to confrontation.   Visual acuity: normal  Right visual field deficit: none  Left visual field deficit: none     CN III, IV, VI   Nystagmus: none   Diplopia: none  Ophthalmoparesis: none  Upgaze: normal  Downgaze: normal  Conjugate gaze: present    CN V   Facial sensation intact.   Right corneal reflex: normal  Left corneal reflex: normal    CN VII   Right facial weakness: none  Left facial weakness: none    CN VIII   Hearing: intact    CN IX, X   Palate: symmetric  Right gag reflex: normal  Left gag reflex: normal    CN XI   Right sternocleidomastoid strength: normal  Left sternocleidomastoid strength: normal    CN XII   Tongue: not atrophic  Fasciculations: absent  Tongue deviation: none    Motor Exam   Muscle bulk: normal  Overall muscle tone: normal  Right arm tone: normal  Left arm tone: normal  Right leg tone: normal  Left leg tone: normal    Sensory Exam   Light touch normal.     Gait, Coordination, and Reflexes     Tremor   Resting tremor: absent  Intention tremor: absent  Action tremor: absent    Reflexes   Reflexes 2+ except as noted.       Physical Exam   Constitutional: He appears well-developed and well-nourished.   Nursing note and vitals reviewed.      Assessment/Plan:       Problems Addressed this Visit        Respiratory    AMRITA (obstructive sleep apnea) - Primary     Compliant with CPAP receiving benefit.      Purchased new machine December 2018.                             "

## 2019-10-03 DIAGNOSIS — G47.33 OSA (OBSTRUCTIVE SLEEP APNEA): ICD-10-CM

## 2019-10-07 RX ORDER — MODAFINIL 200 MG/1
TABLET ORAL
Qty: 30 TABLET | Refills: 2 | Status: SHIPPED | OUTPATIENT
Start: 2019-10-07 | End: 2019-11-19 | Stop reason: SDUPTHER

## 2019-10-07 NOTE — TELEPHONE ENCOUNTER
Dispense 1 month with 2 refills.  Patient will need to make a follow-up appointment for further refills

## 2019-11-19 ENCOUNTER — OFFICE VISIT (OUTPATIENT)
Dept: INTERNAL MEDICINE | Facility: CLINIC | Age: 54
End: 2019-11-19

## 2019-11-19 VITALS
OXYGEN SATURATION: 95 % | RESPIRATION RATE: 20 BRPM | TEMPERATURE: 97.6 F | WEIGHT: 314.13 LBS | BODY MASS INDEX: 45.07 KG/M2 | SYSTOLIC BLOOD PRESSURE: 128 MMHG | DIASTOLIC BLOOD PRESSURE: 84 MMHG | HEART RATE: 94 BPM

## 2019-11-19 DIAGNOSIS — K21.9 GASTROESOPHAGEAL REFLUX DISEASE WITHOUT ESOPHAGITIS: ICD-10-CM

## 2019-11-19 DIAGNOSIS — F32.A DEPRESSION, UNSPECIFIED DEPRESSION TYPE: ICD-10-CM

## 2019-11-19 DIAGNOSIS — G47.33 OSA (OBSTRUCTIVE SLEEP APNEA): ICD-10-CM

## 2019-11-19 DIAGNOSIS — Z23 NEED FOR IMMUNIZATION AGAINST INFLUENZA: Primary | ICD-10-CM

## 2019-11-19 DIAGNOSIS — E78.5 HYPERLIPIDEMIA, UNSPECIFIED HYPERLIPIDEMIA TYPE: ICD-10-CM

## 2019-11-19 PROBLEM — G93.32 CHRONIC FATIGUE SYNDROME: Status: ACTIVE | Noted: 2019-11-19

## 2019-11-19 PROBLEM — G47.61 PERIODIC LIMB MOVEMENT DISORDER: Status: ACTIVE | Noted: 2019-11-19

## 2019-11-19 PROCEDURE — 99214 OFFICE O/P EST MOD 30 MIN: CPT | Performed by: INTERNAL MEDICINE

## 2019-11-19 PROCEDURE — 90471 IMMUNIZATION ADMIN: CPT | Performed by: INTERNAL MEDICINE

## 2019-11-19 PROCEDURE — 90686 IIV4 VACC NO PRSV 0.5 ML IM: CPT | Performed by: INTERNAL MEDICINE

## 2019-11-19 RX ORDER — MODAFINIL 200 MG/1
200 TABLET ORAL DAILY
Qty: 30 TABLET | Refills: 4 | Status: SHIPPED | OUTPATIENT
Start: 2019-11-19 | End: 2021-01-19 | Stop reason: SDUPTHER

## 2019-11-19 RX ORDER — PANTOPRAZOLE SODIUM 40 MG/1
40 TABLET, DELAYED RELEASE ORAL DAILY
Qty: 90 TABLET | Refills: 2 | Status: SHIPPED | OUTPATIENT
Start: 2019-11-19 | End: 2020-01-30 | Stop reason: SDUPTHER

## 2019-11-19 RX ORDER — DESVENLAFAXINE 100 MG/1
100 TABLET, EXTENDED RELEASE ORAL DAILY
Qty: 90 TABLET | Refills: 2 | Status: SHIPPED | OUTPATIENT
Start: 2019-11-19 | End: 2020-01-30 | Stop reason: SDUPTHER

## 2019-11-19 RX ORDER — ATORVASTATIN CALCIUM 10 MG/1
10 TABLET, FILM COATED ORAL DAILY
Qty: 90 TABLET | Refills: 2 | Status: SHIPPED | OUTPATIENT
Start: 2019-11-19 | End: 2020-01-30 | Stop reason: SDUPTHER

## 2019-11-19 NOTE — PROGRESS NOTES
Subjective   Hasmukh Leon is a 53 y.o. male.     History of Present Illness     The following portions of the patient's history were reviewed and updated as appropriate: allergies, current medications, past family history, past medical history, past social history, past surgical history and problem list.    1 hyperlipidemia-chronic and stable.  Continue on the Lipitor and has had no side effects of the medication.  Patient has been monitoring his diet very closely and overall is doing very well.    2 AMRITA-chronic and stable.  Patient continues on the Provigil as this has helped with his overall obstructive sleep apnea and no other concerns at this time.    3 GERD-chronic and stable.  Patient continues on the pantoprazole and also is watching his diet very closely and expressing no side effects from medication.    4 Depression-chronic and stable.  Patient continues on the Pristiq and this too has been doing very well with control of his anxiety and depression.  Patient reports no suicidal ideations, hallucinations, or any other systemic signs.      Review of Systems   All other systems reviewed and are negative.      Objective   Physical Exam   Constitutional: He is oriented to person, place, and time. He appears well-developed and well-nourished.   HENT:   Head: Normocephalic and atraumatic.   Nose: Nose normal.   Mouth/Throat: Oropharynx is clear and moist.   Eyes: EOM are normal. Pupils are equal, round, and reactive to light.   Cardiovascular: Normal rate, regular rhythm and normal heart sounds.   Musculoskeletal: Normal range of motion.   Neurological: He is alert and oriented to person, place, and time.   Nursing note and vitals reviewed.        Assessment/Plan   Hasmukh was seen today for med refill.    Diagnoses and all orders for this visit:    Need for immunization against influenza  -     Fluarix/Fluzone/Afluria Quad>6 Months    AMRITA (obstructive sleep apnea)  -     modafinil (PROVIGIL) 200 MG tablet; Take  1 tablet by mouth Daily.    Hyperlipidemia, unspecified hyperlipidemia type  -     atorvastatin (LIPITOR) 10 MG tablet; Take 1 tablet by mouth Daily.    Gastroesophageal reflux disease without esophagitis  -     pantoprazole (PROTONIX) 40 MG EC tablet; Take 1 tablet by mouth Daily.    Depression, unspecified depression type  -     desvenlafaxine (PRISTIQ) 100 MG 24 hr tablet; Take 1 tablet by mouth Daily.

## 2020-01-30 ENCOUNTER — TELEPHONE (OUTPATIENT)
Dept: INTERNAL MEDICINE | Facility: CLINIC | Age: 55
End: 2020-01-30

## 2020-01-30 DIAGNOSIS — E78.5 HYPERLIPIDEMIA, UNSPECIFIED HYPERLIPIDEMIA TYPE: ICD-10-CM

## 2020-01-30 DIAGNOSIS — F32.A DEPRESSION, UNSPECIFIED DEPRESSION TYPE: ICD-10-CM

## 2020-01-30 DIAGNOSIS — K21.9 GASTROESOPHAGEAL REFLUX DISEASE WITHOUT ESOPHAGITIS: ICD-10-CM

## 2020-01-30 RX ORDER — DESVENLAFAXINE 100 MG/1
100 TABLET, EXTENDED RELEASE ORAL DAILY
Qty: 90 TABLET | Refills: 2 | Status: SHIPPED | OUTPATIENT
Start: 2020-01-30 | End: 2020-12-18 | Stop reason: SDUPTHER

## 2020-01-30 RX ORDER — PANTOPRAZOLE SODIUM 40 MG/1
40 TABLET, DELAYED RELEASE ORAL DAILY
Qty: 90 TABLET | Refills: 2 | Status: SHIPPED | OUTPATIENT
Start: 2020-01-30 | End: 2020-12-18 | Stop reason: SDUPTHER

## 2020-01-30 RX ORDER — ATORVASTATIN CALCIUM 10 MG/1
10 TABLET, FILM COATED ORAL DAILY
Qty: 90 TABLET | Refills: 2 | Status: SHIPPED | OUTPATIENT
Start: 2020-01-30 | End: 2020-12-18 | Stop reason: SDUPTHER

## 2020-01-30 NOTE — TELEPHONE ENCOUNTER
Spouse called in to change pharmacy and request re-fills on the patients medication:    atorvastatin (LIPITOR) 10 MG tablet  desvenlafaxine (PRISTIQ) 100 MG 24 hr tablet  pantoprazole (PROTONIX) 40 MG EC tablet    90 Day supply    [facility], [facility]    Patient call back [phone]

## 2020-03-19 ENCOUNTER — TELEPHONE (OUTPATIENT)
Dept: INTERNAL MEDICINE | Facility: CLINIC | Age: 55
End: 2020-03-19

## 2020-03-19 NOTE — TELEPHONE ENCOUNTER
Patient stated he has a dry and scratchy throat, dry cough, and nasal congestion. This was first noticed 3/17/20 and has gradually been getting worse. Patient would like to know if he needs to be seen for these symptoms.    Mabel mulligan [facility]  in [place]    Please call and advise. Patient call back [phone]

## 2020-03-19 NOTE — TELEPHONE ENCOUNTER
So patient has had the following symptoms for 3 days?  Is this correct?    Any episode or symptoms of high fever?    Any episodes of shortness of breath, chest tightness, shortness of breath with physical activity?    Any nausea, vomiting, or diarrhea?    Let me know

## 2020-03-19 NOTE — TELEPHONE ENCOUNTER
His symptoms are NOT consistent or suggestive of COVID 19 and I would recommend treating supportively for his viral symptoms.

## 2020-03-19 NOTE — TELEPHONE ENCOUNTER
Gave patient providers comments and recommendations. He verbalized good understanding and will call back if symptoms change.

## 2020-03-19 NOTE — TELEPHONE ENCOUNTER
Spoke to patient he stated that he has had the symptoms for 3 days now, no fevers, no chest pain or SOB, no N/V/D.

## 2020-04-23 ENCOUNTER — PRIOR AUTHORIZATION (OUTPATIENT)
Dept: INTERNAL MEDICINE | Facility: CLINIC | Age: 55
End: 2020-04-23

## 2020-09-25 ENCOUNTER — OFFICE VISIT (OUTPATIENT)
Dept: NEUROLOGY | Facility: CLINIC | Age: 55
End: 2020-09-25

## 2020-09-25 DIAGNOSIS — G47.19 EXCESSIVE DAYTIME SLEEPINESS: ICD-10-CM

## 2020-09-25 DIAGNOSIS — G47.33 OSA (OBSTRUCTIVE SLEEP APNEA): Primary | ICD-10-CM

## 2020-09-25 PROCEDURE — 99213 OFFICE O/P EST LOW 20 MIN: CPT | Performed by: PSYCHIATRY & NEUROLOGY

## 2020-09-25 NOTE — PROGRESS NOTES
Subjective:   Chief Complaint   Patient presents with   • Obstructive Sleep Apnea     1yr follow up       You have chosen to receive care through a telephone visit. Do you consent to use a telephone visit for your medical care today? Yes    Time:  10 minutes      Patient ID: Hasmukh Leon is a 54 y.o. male.    History of Present Illness     54 y.o. male with AMRITA with EDS returns in follow up.  Last visit on 9/10/18 continued Nuvigil and renewed mask and supplies.    DME:  [facility]    Working from home.      Compliant with CPAP and receiving benefit.      Provigil 200 mg 1 pm prn once a week.  Feels noticeable better after Provigil.      Changing mask and supplies on scheduled intervals.       The following portions of the patient's history were reviewed and updated as appropriate: allergies, current medications, past medical history, past surgical history and problem list.    Review of Systems   Constitutional: Positive for fatigue. Negative for activity change and unexpected weight change.   HENT: Negative for facial swelling, hearing loss, tinnitus, trouble swallowing and voice change.    Eyes: Negative for photophobia, pain and visual disturbance.   Respiratory: Negative for apnea, cough and choking.    Cardiovascular: Negative for chest pain.   Gastrointestinal: Negative for constipation, nausea and vomiting.   Endocrine: Negative for cold intolerance.   Genitourinary: Negative for difficulty urinating, frequency and urgency.   Musculoskeletal: Negative for arthralgias, back pain, gait problem, myalgias, neck pain and neck stiffness.   Skin: Negative for rash.   Allergic/Immunologic: Negative for immunocompromised state.   Neurological: Negative for dizziness, tremors, seizures, syncope, facial asymmetry, speech difficulty, weakness, light-headedness, numbness and headaches.   Hematological: Negative for adenopathy.   Psychiatric/Behavioral: Positive for sleep disturbance. Negative for confusion,  decreased concentration and hallucinations. The patient is not nervous/anxious.         Objective:     Neurologic Exam     Mental Status   Oriented to person, place, and time.   Attention: normal. Concentration: normal.   Speech: speech is normal   Level of consciousness: alert  Knowledge: good.   Normal comprehension.       Physical Exam   Constitutional: He is oriented to person, place, and time. He appears well-developed.   Neurological: He is oriented to person, place, and time.   Psychiatric: His speech is normal.   Nursing note and vitals reviewed.      Assessment/Plan:       Problems Addressed this Visit        Respiratory    AMRITA (obstructive sleep apnea) - Primary     Renew CPAP mask and supplies             Other    Excessive daytime sleepiness     Sx improved on Provigil            Diagnoses       Codes Comments    AMRITA (obstructive sleep apnea)    -  Primary ICD-10-CM: G47.33  ICD-9-CM: 327.23     Excessive daytime sleepiness     ICD-10-CM: G47.19  ICD-9-CM: 780.54

## 2020-12-18 DIAGNOSIS — K21.9 GASTROESOPHAGEAL REFLUX DISEASE WITHOUT ESOPHAGITIS: ICD-10-CM

## 2020-12-18 DIAGNOSIS — F32.A DEPRESSION, UNSPECIFIED DEPRESSION TYPE: ICD-10-CM

## 2020-12-18 DIAGNOSIS — E78.5 HYPERLIPIDEMIA, UNSPECIFIED HYPERLIPIDEMIA TYPE: ICD-10-CM

## 2020-12-18 NOTE — TELEPHONE ENCOUNTER
Last Office Visit: 11/192019  Next Office Visit: None scheduled    Labs completed in past 6 months? no  Labs completed in past year? no    Last Refill Date: 01/30/2020  Quantity: 90  Refills: 2    Pharmacy: [id number] - [place], [place] - [address] - [phone]  - [phone] FX

## 2020-12-18 NOTE — TELEPHONE ENCOUNTER
DELETE AFTER REVIEWING: Telephone encounter to be sent to the clinical pool.  If patient has less than a 3 day supply left, send the encounter HIGH Priority.    Caller: Sindy Olvera    Relationship: Emergency Contact    Best call back number: [phone]     Medication needed:   Requested Prescriptions     Pending Prescriptions Disp Refills   • desvenlafaxine (PRISTIQ) 100 MG 24 hr tablet 90 tablet 2     Sig: Take 1 tablet by mouth Daily.   • atorvastatin (LIPITOR) 10 MG tablet 90 tablet 2     Sig: Take 1 tablet by mouth Daily.   • pantoprazole (PROTONIX) 40 MG EC tablet 90 tablet 2     Sig: Take 1 tablet by mouth Daily.       When do you need the refill by: 12/21/20    Does the patient have less than a 3 day supply:  [] Yes  [x] No    What is the patient's preferred pharmacy: [id number] [address]  AT [facility] [phone] [facility] [phone] FX           DELETE AFTER READING TO PATIENT: “Thank you for sharing this information with me. I will send a message to the clinical team. Please allow 48 hours for the clinical staff to follow up on this request.”

## 2020-12-19 RX ORDER — DESVENLAFAXINE 100 MG/1
100 TABLET, EXTENDED RELEASE ORAL DAILY
Qty: 30 TABLET | Refills: 1 | Status: SHIPPED | OUTPATIENT
Start: 2020-12-19 | End: 2021-01-19 | Stop reason: SDUPTHER

## 2020-12-19 RX ORDER — PANTOPRAZOLE SODIUM 40 MG/1
40 TABLET, DELAYED RELEASE ORAL DAILY
Qty: 30 TABLET | Refills: 2 | Status: SHIPPED | OUTPATIENT
Start: 2020-12-19 | End: 2021-01-19 | Stop reason: SDUPTHER

## 2020-12-19 RX ORDER — ATORVASTATIN CALCIUM 10 MG/1
10 TABLET, FILM COATED ORAL DAILY
Qty: 30 TABLET | Refills: 1 | Status: SHIPPED | OUTPATIENT
Start: 2020-12-19 | End: 2021-01-19 | Stop reason: SDUPTHER

## 2020-12-19 NOTE — TELEPHONE ENCOUNTER
Patient will be given courtesy refills but patient needs to make a follow-up with labs for further refills

## 2021-01-19 ENCOUNTER — TELEMEDICINE (OUTPATIENT)
Dept: INTERNAL MEDICINE | Facility: CLINIC | Age: 56
End: 2021-01-19

## 2021-01-19 DIAGNOSIS — K21.9 GASTROESOPHAGEAL REFLUX DISEASE WITHOUT ESOPHAGITIS: ICD-10-CM

## 2021-01-19 DIAGNOSIS — Z12.5 SCREENING PSA (PROSTATE SPECIFIC ANTIGEN): Primary | ICD-10-CM

## 2021-01-19 DIAGNOSIS — G47.33 OSA (OBSTRUCTIVE SLEEP APNEA): ICD-10-CM

## 2021-01-19 DIAGNOSIS — E78.5 HYPERLIPIDEMIA, UNSPECIFIED HYPERLIPIDEMIA TYPE: ICD-10-CM

## 2021-01-19 DIAGNOSIS — F32.A DEPRESSION, UNSPECIFIED DEPRESSION TYPE: ICD-10-CM

## 2021-01-19 PROCEDURE — 99214 OFFICE O/P EST MOD 30 MIN: CPT | Performed by: INTERNAL MEDICINE

## 2021-01-19 RX ORDER — PANTOPRAZOLE SODIUM 40 MG/1
40 TABLET, DELAYED RELEASE ORAL DAILY
Qty: 90 TABLET | Refills: 3 | Status: SHIPPED | OUTPATIENT
Start: 2021-01-19 | End: 2022-02-14

## 2021-01-19 RX ORDER — DESVENLAFAXINE 100 MG/1
100 TABLET, EXTENDED RELEASE ORAL DAILY
Qty: 90 TABLET | Refills: 3 | Status: SHIPPED | OUTPATIENT
Start: 2021-01-19 | End: 2022-02-14

## 2021-01-19 RX ORDER — MODAFINIL 200 MG/1
200 TABLET ORAL DAILY
Qty: 90 TABLET | Refills: 2 | Status: SHIPPED | OUTPATIENT
Start: 2021-01-19 | End: 2021-07-23

## 2021-01-19 RX ORDER — ATORVASTATIN CALCIUM 10 MG/1
10 TABLET, FILM COATED ORAL DAILY
Qty: 90 TABLET | Refills: 3 | Status: SHIPPED | OUTPATIENT
Start: 2021-01-19 | End: 2022-02-14

## 2021-01-19 NOTE — PROGRESS NOTES
Subjective   Hasmukh Leon is a 55 y.o. male.     History of Present Illness     The following portions of the patient's history were reviewed and updated as appropriate: allergies, current medications, past family history, past medical history, past social history, past surgical history and problem list.    Patient has consented for video MyChart    1 AMRITA-chronic and controlled.  Patient continues on the Provigil which is helped tremendously with the obstructive sleep apnea.    2 depression -chronic and controlled.  Patient denies any suicidal ideations, emotional liability or threats towards himself or anybody else and the Pristiq has been doing very well with control of his mental health.    3 GERD-chronic and controlled.  Patient continues on the Protonix for his reflux and this has been doing very well    4 Hyperlipidemia-chronic and controlled.  Patient continues on the cholesterol medication and also diet modifications.        Review of Systems   All other systems reviewed and are negative.      Objective   Physical Exam  Vitals signs and nursing note reviewed.   Constitutional:       Appearance: Normal appearance. He is normal weight.   HENT:      Head: Normocephalic and atraumatic.      Right Ear: Tympanic membrane, ear canal and external ear normal.      Left Ear: Tympanic membrane, ear canal and external ear normal.      Nose: Nose normal.      Mouth/Throat:      Mouth: Mucous membranes are moist.   Eyes:      Extraocular Movements: Extraocular movements intact.      Conjunctiva/sclera: Conjunctivae normal.      Pupils: Pupils are equal, round, and reactive to light.   Neck:      Musculoskeletal: Normal range of motion and neck supple.   Cardiovascular:      Rate and Rhythm: Normal rate.      Pulses: Normal pulses.      Heart sounds: Normal heart sounds.   Pulmonary:      Effort: Pulmonary effort is normal.      Breath sounds: Normal breath sounds.   Abdominal:      General: Bowel sounds are normal.       Palpations: Abdomen is soft.   Musculoskeletal: Normal range of motion.   Skin:     General: Skin is warm.      Capillary Refill: Capillary refill takes less than 2 seconds.   Neurological:      General: No focal deficit present.      Mental Status: He is alert.           Assessment/Plan   Diagnoses and all orders for this visit:    Spent 25 minutes face-to-face via video chat with patient    1. Screening PSA (prostate specific antigen) (Primary)  -     PSA Screen; Future    2. AMRITA (obstructive sleep apnea)  -     modafinil (PROVIGIL) 200 MG tablet; Take 1 tablet by mouth Daily.  Dispense: 90 tablet; Refill: 2    3. Depression, unspecified depression type  -     desvenlafaxine (PRISTIQ) 100 MG 24 hr tablet; Take 1 tablet by mouth Daily.  Dispense: 90 tablet; Refill: 3  -     CBC (No Diff); Future  -     Comprehensive Metabolic Panel; Future  -     TSH; Future  -     T4, Free; Future    4. Gastroesophageal reflux disease without esophagitis  -     pantoprazole (PROTONIX) 40 MG EC tablet; Take 1 tablet by mouth Daily.  Dispense: 90 tablet; Refill: 3    5. Hyperlipidemia, unspecified hyperlipidemia type  -     atorvastatin (LIPITOR) 10 MG tablet; Take 1 tablet by mouth Daily.  Dispense: 90 tablet; Refill: 3  -     Lipid Panel; Future

## 2021-01-21 ENCOUNTER — LAB (OUTPATIENT)
Dept: LAB | Facility: HOSPITAL | Age: 56
End: 2021-01-21

## 2021-01-21 DIAGNOSIS — E78.5 HYPERLIPIDEMIA, UNSPECIFIED HYPERLIPIDEMIA TYPE: ICD-10-CM

## 2021-01-21 DIAGNOSIS — F32.A DEPRESSION, UNSPECIFIED DEPRESSION TYPE: ICD-10-CM

## 2021-01-21 DIAGNOSIS — Z12.5 SCREENING PSA (PROSTATE SPECIFIC ANTIGEN): ICD-10-CM

## 2021-01-21 LAB
ALBUMIN SERPL-MCNC: 4.6 G/DL (ref 3.5–5.2)
ALBUMIN/GLOB SERPL: 1.7 G/DL
ALP SERPL-CCNC: 89 U/L (ref 39–117)
ALT SERPL W P-5'-P-CCNC: 51 U/L (ref 1–41)
ANION GAP SERPL CALCULATED.3IONS-SCNC: 12.9 MMOL/L (ref 5–15)
AST SERPL-CCNC: 35 U/L (ref 1–40)
BILIRUB SERPL-MCNC: 0.3 MG/DL (ref 0–1.2)
BUN SERPL-MCNC: 15 MG/DL (ref 6–20)
BUN/CREAT SERPL: 13.6 (ref 7–25)
CALCIUM SPEC-SCNC: 9.8 MG/DL (ref 8.6–10.5)
CHLORIDE SERPL-SCNC: 100 MMOL/L (ref 98–107)
CHOLEST SERPL-MCNC: 187 MG/DL (ref 0–200)
CO2 SERPL-SCNC: 28.1 MMOL/L (ref 22–29)
CREAT SERPL-MCNC: 1.1 MG/DL (ref 0.76–1.27)
DEPRECATED RDW RBC AUTO: 42.1 FL (ref 37–54)
ERYTHROCYTE [DISTWIDTH] IN BLOOD BY AUTOMATED COUNT: 13.1 % (ref 12.3–15.4)
GFR SERPL CREATININE-BSD FRML MDRD: 69 ML/MIN/1.73
GLOBULIN UR ELPH-MCNC: 2.7 GM/DL
GLUCOSE SERPL-MCNC: 91 MG/DL (ref 65–99)
HCT VFR BLD AUTO: 48.7 % (ref 37.5–51)
HDLC SERPL-MCNC: 37 MG/DL (ref 40–60)
HGB BLD-MCNC: 16.6 G/DL (ref 13–17.7)
LDLC SERPL CALC-MCNC: 97 MG/DL (ref 0–100)
LDLC/HDLC SERPL: 2.34 {RATIO}
MCH RBC QN AUTO: 30 PG (ref 26.6–33)
MCHC RBC AUTO-ENTMCNC: 34.1 G/DL (ref 31.5–35.7)
MCV RBC AUTO: 88.1 FL (ref 79–97)
PLATELET # BLD AUTO: 309 10*3/MM3 (ref 140–450)
PMV BLD AUTO: 10.1 FL (ref 6–12)
POTASSIUM SERPL-SCNC: 4.7 MMOL/L (ref 3.5–5.2)
PROT SERPL-MCNC: 7.3 G/DL (ref 6–8.5)
PSA SERPL-MCNC: 0.47 NG/ML (ref 0–4)
RBC # BLD AUTO: 5.53 10*6/MM3 (ref 4.14–5.8)
SODIUM SERPL-SCNC: 141 MMOL/L (ref 136–145)
T4 FREE SERPL-MCNC: 1.21 NG/DL (ref 0.93–1.7)
TRIGL SERPL-MCNC: 317 MG/DL (ref 0–150)
TSH SERPL DL<=0.05 MIU/L-ACNC: 2.69 UIU/ML (ref 0.27–4.2)
VLDLC SERPL-MCNC: 53 MG/DL (ref 5–40)
WBC # BLD AUTO: 6.8 10*3/MM3 (ref 3.4–10.8)

## 2021-01-21 PROCEDURE — 36415 COLL VENOUS BLD VENIPUNCTURE: CPT

## 2021-01-21 PROCEDURE — 85027 COMPLETE CBC AUTOMATED: CPT

## 2021-01-21 PROCEDURE — 80053 COMPREHEN METABOLIC PANEL: CPT

## 2021-01-21 PROCEDURE — G0103 PSA SCREENING: HCPCS

## 2021-01-21 PROCEDURE — 80061 LIPID PANEL: CPT

## 2021-01-21 PROCEDURE — 84443 ASSAY THYROID STIM HORMONE: CPT

## 2021-01-21 PROCEDURE — 84439 ASSAY OF FREE THYROXINE: CPT

## 2021-02-16 DIAGNOSIS — F32.A DEPRESSION, UNSPECIFIED DEPRESSION TYPE: ICD-10-CM

## 2021-02-16 DIAGNOSIS — E78.5 HYPERLIPIDEMIA, UNSPECIFIED HYPERLIPIDEMIA TYPE: ICD-10-CM

## 2021-02-17 RX ORDER — DESVENLAFAXINE 100 MG/1
100 TABLET, EXTENDED RELEASE ORAL DAILY
Qty: 30 TABLET | OUTPATIENT
Start: 2021-02-17

## 2021-02-17 RX ORDER — ATORVASTATIN CALCIUM 10 MG/1
10 TABLET, FILM COATED ORAL DAILY
Qty: 30 TABLET | OUTPATIENT
Start: 2021-02-17

## 2021-07-23 DIAGNOSIS — G47.33 OSA (OBSTRUCTIVE SLEEP APNEA): ICD-10-CM

## 2021-07-23 RX ORDER — MODAFINIL 200 MG/1
200 TABLET ORAL DAILY
Qty: 90 TABLET | Refills: 2 | Status: SHIPPED | OUTPATIENT
Start: 2021-07-23 | End: 2021-07-28 | Stop reason: SDUPTHER

## 2021-07-27 ENCOUNTER — TELEPHONE (OUTPATIENT)
Dept: NEUROLOGY | Facility: CLINIC | Age: 56
End: 2021-07-27

## 2021-07-27 ENCOUNTER — TELEPHONE (OUTPATIENT)
Dept: INTERNAL MEDICINE | Facility: CLINIC | Age: 56
End: 2021-07-27

## 2021-07-27 ENCOUNTER — PRIOR AUTHORIZATION (OUTPATIENT)
Dept: INTERNAL MEDICINE | Facility: CLINIC | Age: 56
End: 2021-07-27

## 2021-07-27 DIAGNOSIS — G47.33 OSA (OBSTRUCTIVE SLEEP APNEA): ICD-10-CM

## 2021-07-27 RX ORDER — MODAFINIL 200 MG/1
200 TABLET ORAL DAILY
Qty: 90 TABLET | Refills: 2 | Status: CANCELLED | OUTPATIENT
Start: 2021-07-27

## 2021-07-27 NOTE — TELEPHONE ENCOUNTER
PT NEEDS EPWORTH SLEEPINESS SCALE SCORE    SEE ENCOUNTER FROM DR MOORE FROM TODAY- 7/27/21-     THANKS.

## 2021-07-28 DIAGNOSIS — G47.33 OSA (OBSTRUCTIVE SLEEP APNEA): ICD-10-CM

## 2021-07-28 NOTE — TELEPHONE ENCOUNTER
Provider:  DR ROJAS   Caller:  BRONWYN   Relationship to Patient: PT     Phone Number: [phone]  Reason for Call:  PT CALLING IN REGARDING PRE AUTH   FOR  modafinil (PROVIGIL) 200 MG tablet  HAS  MESSAGE IN ALREADY REGARDING EPSWORTH SLEEPINESS SCALE  PLEASE ADVISE

## 2021-07-29 NOTE — TELEPHONE ENCOUNTER
Completed [place] sleepiness scale over the phone with Robe and will get this scanned in for 's office. He also needs Beny VARGAS completed.

## 2021-07-30 RX ORDER — MODAFINIL 200 MG/1
200 TABLET ORAL DAILY
Qty: 90 TABLET | Refills: 2 | Status: SHIPPED | OUTPATIENT
Start: 2021-07-30 | End: 2022-05-17

## 2021-07-30 NOTE — TELEPHONE ENCOUNTER
Modafinil is prescribed by , not Margot so their office will need to complete this.  Left detailed vm letting Robe know. Thanks!

## 2021-08-03 ENCOUNTER — TELEPHONE (OUTPATIENT)
Dept: INTERNAL MEDICINE | Facility: CLINIC | Age: 56
End: 2021-08-03

## 2021-08-03 NOTE — TELEPHONE ENCOUNTER
Spoke to patient he stated that the neurologist was suppose to send us the score Ill look through [facility] paperwork and try to find it.

## 2021-08-03 NOTE — TELEPHONE ENCOUNTER
Caller: Hasmukh Leon    Relationship: Self    Best call back number: [phone] (H)    Medication needed:   modafinil (PROVIGIL) 200 MG tablet  200 mg, Daily 2 ordered         Summary: Take 1 tablet by mouth Daily., Starting Fri 7/30/2021, Normal   Dose, Route, Frequency: 200 mg, Oral, Daily          When do you need the refill by: TODAY    What additional details did the patient provide when requesting the medication: PATIENT STATES THAT HE IS -COMPLETELY OUT OF THIS MEDICATION. PATIENT STATES THAT HIS NEUROLOGIST SENT OVER THE REQUIRED INFORMATION FOR THE PRIOR AUTHORIZATION FOR THIS MEDICATION TO BE FILLED. PATIENT HAS REQUESTED A CALL BACK TO CONFIRM PRESCRIPTION HAS BEEN SENT TO THE PHARMACY.    Does the patient have less than a 3 day supply:  [x] Yes  [] No    What is the patient's preferred pharmacy:    [facility] #[id number] [address]  AT [facility] - [phone] [facility] [phone]       PATIENT HAS BEEN ADVISED THAT THIS REQUEST HAS BEEN MARKED AS A HIGH PRIORITY TO ALLOW 48 HOURS FOR THE CLINICAL TEAM TO FOLLOW UP ON THIS REQUEST,  IF SYMPTOMS WORSENS TO SEEK OUT EMERGENT CARE. PATIENT  FULLY UNDERSTANDS.

## 2021-08-05 ENCOUNTER — TELEPHONE (OUTPATIENT)
Dept: INTERNAL MEDICINE | Facility: CLINIC | Age: 56
End: 2021-08-05

## 2021-08-05 NOTE — TELEPHONE ENCOUNTER
Medication has been approved and patient has been notified. PA Case: [id number], Status: Approved, Coverage Starts on: 8/5/2021 12:00:00 AM, Coverage Ends on: 8/5/2022 12:00:00 AM.

## 2021-08-05 NOTE — TELEPHONE ENCOUNTER
PA has been APPROVED and patient has been notified.       PA Case: [id number], Status: Approved, Coverage Starts on: 8/5/2021 12:00:00 AM, Coverage Ends on: 8/5/2022 12:00:00 AM.

## 2021-08-05 NOTE — TELEPHONE ENCOUNTER
Caller: Carolyn Hasmukh    Relationship: Self    Best call back number: [phone]     What is the best time to reach you: ANY TIME    Who are you requesting to speak with (clinical staff, provider,  specific staff member): NURSE OR DR MOORE    Do you know the name of the person who called: SELF  What was the call regarding: PATIENT CHECKING ON STATUS OF PRIOR AUTHORIZATION FOR MEDICATION AND IT HAS BEEN OVER 48 HOURS AND PATIENT HAS BEEN OUT OF MEDICATION SO WOULD APPRECIATE A CALL BACK WITH THE STATUS.    Do you require a callback: YES

## 2021-08-10 ENCOUNTER — PRIOR AUTHORIZATION (OUTPATIENT)
Dept: INTERNAL MEDICINE | Facility: CLINIC | Age: 56
End: 2021-08-10

## 2021-08-10 NOTE — TELEPHONE ENCOUNTER
Hasmukh Leon (Key: [id number])  Drug  Modafinil 200MG tablets    This request has received a Favorable outcome.    Please note any additional information provided by [employer] at the bottom of this request.

## 2021-08-31 ENCOUNTER — TELEPHONE (OUTPATIENT)
Dept: NEUROLOGY | Facility: CLINIC | Age: 56
End: 2021-08-31

## 2021-09-09 ENCOUNTER — TELEMEDICINE (OUTPATIENT)
Dept: NEUROLOGY | Facility: CLINIC | Age: 56
End: 2021-09-09

## 2021-09-09 DIAGNOSIS — F32.A DEPRESSION, UNSPECIFIED DEPRESSION TYPE: ICD-10-CM

## 2021-09-09 DIAGNOSIS — G47.61 PERIODIC LIMB MOVEMENT DISORDER: Chronic | ICD-10-CM

## 2021-09-09 DIAGNOSIS — G93.32 CHRONIC FATIGUE SYNDROME: Chronic | ICD-10-CM

## 2021-09-09 DIAGNOSIS — G47.33 OSA (OBSTRUCTIVE SLEEP APNEA): Primary | ICD-10-CM

## 2021-09-09 PROCEDURE — 99214 OFFICE O/P EST MOD 30 MIN: CPT | Performed by: PSYCHIATRY & NEUROLOGY

## 2021-09-09 NOTE — PROGRESS NOTES
Chief Complaint    AMRITA and fatigue     Subjective          Hasmukh Leon presents to [facility] NEUROLOGY     History of Present Illness    55 y.o. male returns in follow up.  Last visit on 9/25/2020 continued Nuvigil and renewed mask and supplies.     DME:  [facility]     Requesting new machine.      Compliant with CPAP and receiving benefit.       Provigil 100 mg 10:30 am daily and 100 mg qpm prn .         Changing mask and supplies on scheduled intervals.    Objective      Physical Exam  Eyes:      Extraocular Movements: EOM normal.   Neurological:      Mental Status: He is oriented to person, place, and time.      Gait: Gait is intact.   Psychiatric:         Speech: Speech normal.          Neurologic Exam     Mental Status   Oriented to person, place, and time.   Attention: normal. Concentration: normal.   Speech: speech is normal   Level of consciousness: alert  Knowledge: good.   Normal comprehension.     Cranial Nerves     CN III, IV, VI   Extraocular motions are normal.     CN VII   Facial expression full, symmetric.     CN VIII   CN VIII normal.     Motor Exam MAEW     Gait, Coordination, and Reflexes     Gait  Gait: normal     Result Review :   The following data was reviewed by: Jorge Frost MD on 09/09/2021:  Common labs    Common Labsle 1/21/21 1/21/21 1/21/21 1/21/21    0931 0931 0931 0931   Glucose  91     BUN  15     Creatinine  1.10     eGFR Non African Am  69     Sodium  141     Potassium  4.7     Chloride  100     Calcium  9.8     Albumin  4.60     Total Bilirubin  0.3     Alkaline Phosphatase  89     AST (SGOT)  35     ALT (SGPT)  51 (A)     WBC 6.80      Hemoglobin 16.6      Hematocrit 48.7      Platelets 309      Total Cholesterol   187    Triglycerides   317 (A)    HDL Cholesterol   37 (A)    LDL Cholesterol    97    PSA    0.467   (A) Abnormal value                      Assessment and Plan    Diagnoses and all orders for this visit:    1. AMRITA (obstructive sleep  apnea) (Primary)  Assessment & Plan:  Requesting a new machine     Autopap 8 - 20 cm H20          2. Periodic limb movement disorder    3. Depression, unspecified depression type    4. Chronic fatigue syndrome  Assessment & Plan:  Improved with provigil         Follow Up   No follow-ups on file.  Patient was given instructions and counseling regarding his condition or for health maintenance advice. Please see specific information pulled into the AVS if appropriate.

## 2021-10-15 ENCOUNTER — OFFICE VISIT (OUTPATIENT)
Dept: INTERNAL MEDICINE | Facility: CLINIC | Age: 56
End: 2021-10-15

## 2021-10-15 VITALS
TEMPERATURE: 97.5 F | OXYGEN SATURATION: 96 % | SYSTOLIC BLOOD PRESSURE: 148 MMHG | HEART RATE: 101 BPM | RESPIRATION RATE: 20 BRPM | DIASTOLIC BLOOD PRESSURE: 102 MMHG | BODY MASS INDEX: 48.01 KG/M2 | WEIGHT: 315 LBS

## 2021-10-15 DIAGNOSIS — M79.601 PAIN IN BOTH UPPER EXTREMITIES: Primary | ICD-10-CM

## 2021-10-15 DIAGNOSIS — M79.602 PAIN IN BOTH UPPER EXTREMITIES: Primary | ICD-10-CM

## 2021-10-15 DIAGNOSIS — M77.11 LATERAL EPICONDYLITIS OF BOTH ELBOWS: ICD-10-CM

## 2021-10-15 DIAGNOSIS — M77.12 LATERAL EPICONDYLITIS OF BOTH ELBOWS: ICD-10-CM

## 2021-10-15 LAB
CHROMATIN AB SERPL-ACNC: <10 IU/ML (ref 0–14)
CK SERPL-CCNC: 152 U/L (ref 20–200)
ERYTHROCYTE [SEDIMENTATION RATE] IN BLOOD: 15 MM/HR (ref 0–20)
URATE SERPL-MCNC: 5.9 MG/DL (ref 3.4–7)

## 2021-10-15 PROCEDURE — 86256 FLUORESCENT ANTIBODY TITER: CPT | Performed by: NURSE PRACTITIONER

## 2021-10-15 PROCEDURE — 90471 IMMUNIZATION ADMIN: CPT | Performed by: NURSE PRACTITIONER

## 2021-10-15 PROCEDURE — 82550 ASSAY OF CK (CPK): CPT | Performed by: NURSE PRACTITIONER

## 2021-10-15 PROCEDURE — 83520 IMMUNOASSAY QUANT NOS NONAB: CPT | Performed by: NURSE PRACTITIONER

## 2021-10-15 PROCEDURE — 90686 IIV4 VACC NO PRSV 0.5 ML IM: CPT | Performed by: NURSE PRACTITIONER

## 2021-10-15 PROCEDURE — 86038 ANTINUCLEAR ANTIBODIES: CPT | Performed by: NURSE PRACTITIONER

## 2021-10-15 PROCEDURE — 99213 OFFICE O/P EST LOW 20 MIN: CPT | Performed by: NURSE PRACTITIONER

## 2021-10-15 PROCEDURE — 85652 RBC SED RATE AUTOMATED: CPT | Performed by: NURSE PRACTITIONER

## 2021-10-15 PROCEDURE — 86431 RHEUMATOID FACTOR QUANT: CPT | Performed by: NURSE PRACTITIONER

## 2021-10-15 PROCEDURE — 84550 ASSAY OF BLOOD/URIC ACID: CPT | Performed by: NURSE PRACTITIONER

## 2021-10-15 RX ORDER — METHYLPREDNISOLONE 4 MG/1
TABLET ORAL
Qty: 1 EACH | Refills: 0 | Status: SHIPPED | OUTPATIENT
Start: 2021-10-15 | End: 2021-12-17

## 2021-10-19 LAB
ANA SER QL: NEGATIVE
C-ANCA TITR SER IF: NORMAL TITER
MYELOPEROXIDASE AB SER IA-ACNC: <9 U/ML (ref 0–9)
P-ANCA ATYPICAL TITR SER IF: NORMAL TITER
P-ANCA TITR SER IF: NORMAL TITER
PROTEINASE3 AB SER IA-ACNC: <3.5 U/ML (ref 0–3.5)

## 2021-12-17 ENCOUNTER — OFFICE VISIT (OUTPATIENT)
Dept: INTERNAL MEDICINE | Facility: CLINIC | Age: 56
End: 2021-12-17

## 2021-12-17 VITALS
SYSTOLIC BLOOD PRESSURE: 170 MMHG | TEMPERATURE: 97.3 F | DIASTOLIC BLOOD PRESSURE: 120 MMHG | BODY MASS INDEX: 45.1 KG/M2 | OXYGEN SATURATION: 99 % | RESPIRATION RATE: 14 BRPM | WEIGHT: 315 LBS | HEART RATE: 104 BPM | HEIGHT: 70 IN

## 2021-12-17 DIAGNOSIS — M79.601 PAIN IN BOTH UPPER EXTREMITIES: Primary | ICD-10-CM

## 2021-12-17 DIAGNOSIS — M79.602 PAIN IN BOTH UPPER EXTREMITIES: Primary | ICD-10-CM

## 2021-12-17 DIAGNOSIS — M77.8 TENDONITIS OF BOTH ELBOWS: ICD-10-CM

## 2021-12-17 PROCEDURE — 99213 OFFICE O/P EST LOW 20 MIN: CPT | Performed by: INTERNAL MEDICINE

## 2021-12-17 NOTE — PROGRESS NOTES
"Chief Complaint  Arm Pain (Bilatetral, still painful after steriods.)    Subjective    Hasmukh Leon is a 56 y.o. male.     Hasmukh Leon presents to [facility] for       History of Present Illness    The following portions of the patient's history were reviewed and updated as appropriate: allergies, current medications, past family history, past medical history, past social history, past surgical history and problem list.    Bilateral arm pain-patient says that bilateral shoulders have been hurting him.  Pain is made worse with extending or rotation movement or when he has to transfer.  He continues with NSAIDs and this is provided partial relief but bilateral arm pain has interfered with his overall activities of daily living quality life.    Review of Systems   All other systems reviewed and are negative.      Objective   Vital Signs:   BP (!) 170/120   Pulse 104   Temp 97.3 °F (36.3 °C)   Resp 14   Ht 177.8 cm (70\")   Wt (!) 153 kg (337 lb)   SpO2 99%   BMI 48.35 kg/m²     Body mass index is 48.35 kg/m².    Physical Exam  Vitals and nursing note reviewed.   Constitutional:       Appearance: Normal appearance. He is normal weight.   HENT:      Head: Normocephalic and atraumatic.      Nose: Nose normal.      Mouth/Throat:      Mouth: Mucous membranes are moist.   Eyes:      Extraocular Movements: Extraocular movements intact.      Pupils: Pupils are equal, round, and reactive to light.   Cardiovascular:      Rate and Rhythm: Normal rate and regular rhythm.      Pulses: Normal pulses.      Heart sounds: Normal heart sounds.   Pulmonary:      Effort: Pulmonary effort is normal.      Breath sounds: Normal breath sounds.   Abdominal:      General: Bowel sounds are normal.      Palpations: Abdomen is soft.   Musculoskeletal:         General: Tenderness present. Normal range of motion.      Cervical back: Normal range of motion and neck supple.      Comments: " Bilateral pain in both shoulders with manipulation rotation movement   Skin:     General: Skin is warm.   Neurological:      General: No focal deficit present.      Mental Status: He is alert.               Assessment and Plan  Diagnoses and all orders for this visit:      Diagnoses and all orders for this visit:    1. Pain in both upper extremities (Primary)  -     Ambulatory Referral to Physical Therapy Evaluate and treat    2. Tendonitis of both elbows  -     Ambulatory Referral to Physical Therapy Evaluate and treat    Return to clinic as needed

## 2021-12-23 PROCEDURE — U0004 COV-19 TEST NON-CDC HGH THRU: HCPCS | Performed by: PHYSICIAN ASSISTANT

## 2022-02-01 RX ORDER — AMLODIPINE BESYLATE 5 MG/1
5 TABLET ORAL DAILY
Qty: 90 TABLET | Refills: 2 | Status: SHIPPED | OUTPATIENT
Start: 2022-02-01 | End: 2022-09-08

## 2022-02-14 ENCOUNTER — TELEPHONE (OUTPATIENT)
Dept: INTERNAL MEDICINE | Facility: CLINIC | Age: 57
End: 2022-02-14

## 2022-02-14 DIAGNOSIS — K21.9 GASTROESOPHAGEAL REFLUX DISEASE WITHOUT ESOPHAGITIS: ICD-10-CM

## 2022-02-14 DIAGNOSIS — E78.5 HYPERLIPIDEMIA, UNSPECIFIED HYPERLIPIDEMIA TYPE: ICD-10-CM

## 2022-02-14 DIAGNOSIS — F32.A DEPRESSION, UNSPECIFIED DEPRESSION TYPE: ICD-10-CM

## 2022-02-14 RX ORDER — PANTOPRAZOLE SODIUM 40 MG/1
40 TABLET, DELAYED RELEASE ORAL DAILY
Qty: 90 TABLET | Refills: 3 | Status: SHIPPED | OUTPATIENT
Start: 2022-02-14 | End: 2023-01-31

## 2022-02-14 RX ORDER — ATORVASTATIN CALCIUM 10 MG/1
10 TABLET, FILM COATED ORAL DAILY
Qty: 90 TABLET | Refills: 3 | Status: SHIPPED | OUTPATIENT
Start: 2022-02-14 | End: 2023-01-31

## 2022-02-14 RX ORDER — DESVENLAFAXINE 100 MG/1
100 TABLET, EXTENDED RELEASE ORAL DAILY
Qty: 90 TABLET | Refills: 3 | Status: SHIPPED | OUTPATIENT
Start: 2022-02-14 | End: 2023-01-31

## 2022-02-14 NOTE — TELEPHONE ENCOUNTER
LOV 12/17/2021  NOV Not scheduled yet, Tried to call patient no answer LVM to CB to schedule a follow up appointment.   max. was 1ppd, currently .5ppd/cigarettes

## 2022-02-14 NOTE — TELEPHONE ENCOUNTER
Patients spouse is calling back with questions regarding medication and wants to speak with  Nurse.

## 2022-02-16 NOTE — TELEPHONE ENCOUNTER
Spoke to pt, advised of clinical message. They wanted to confirm the Rx were called in, but the pharmacy already contacted then. Advised he's due to schedule an F/U visit. Pt states he will check hi schedule at work and request a day off so that he works around his schedule. Good verbal understanding.

## 2022-03-14 DIAGNOSIS — I10 PRIMARY HYPERTENSION: Primary | ICD-10-CM

## 2022-03-14 RX ORDER — HYDROCHLOROTHIAZIDE 12.5 MG/1
12.5 TABLET ORAL DAILY
Qty: 90 TABLET | Refills: 2 | Status: SHIPPED | OUTPATIENT
Start: 2022-03-14 | End: 2022-12-15

## 2022-04-04 PROCEDURE — U0004 COV-19 TEST NON-CDC HGH THRU: HCPCS

## 2022-04-08 DIAGNOSIS — M79.602 PAIN IN BOTH UPPER EXTREMITIES: Primary | ICD-10-CM

## 2022-04-08 DIAGNOSIS — M77.12 LATERAL EPICONDYLITIS OF BOTH ELBOWS: ICD-10-CM

## 2022-04-08 DIAGNOSIS — M77.11 LATERAL EPICONDYLITIS OF BOTH ELBOWS: ICD-10-CM

## 2022-04-08 DIAGNOSIS — M79.601 PAIN IN BOTH UPPER EXTREMITIES: Primary | ICD-10-CM

## 2022-04-08 DIAGNOSIS — M77.8 TENDONITIS OF BOTH ELBOWS: ICD-10-CM

## 2022-04-19 DIAGNOSIS — I10 PRIMARY HYPERTENSION: Primary | ICD-10-CM

## 2022-04-19 RX ORDER — AMLODIPINE BESYLATE 10 MG/1
10 TABLET ORAL DAILY
Qty: 90 TABLET | Refills: 2 | Status: SHIPPED | OUTPATIENT
Start: 2022-04-19 | End: 2023-01-31

## 2022-05-11 ENCOUNTER — TREATMENT (OUTPATIENT)
Dept: PHYSICAL THERAPY | Facility: CLINIC | Age: 57
End: 2022-05-11

## 2022-05-11 DIAGNOSIS — M25.521 RIGHT ELBOW PAIN: Primary | ICD-10-CM

## 2022-05-11 PROCEDURE — 97162 PT EVAL MOD COMPLEX 30 MIN: CPT | Performed by: PHYSICAL THERAPIST

## 2022-05-11 PROCEDURE — 97140 MANUAL THERAPY 1/> REGIONS: CPT | Performed by: PHYSICAL THERAPIST

## 2022-05-11 PROCEDURE — 97110 THERAPEUTIC EXERCISES: CPT | Performed by: PHYSICAL THERAPIST

## 2022-05-11 NOTE — PROGRESS NOTES
Physical Therapy Initial Evaluation and Plan of Care    Subjective Evaluation    History of Present Illness  Mechanism of injury: Pt is a 56 year old male presenting to PT with bilateral elbow pain. His right elbow is much worse than his left. He was learning how to dance for his daughters wedding last summer and noticed more soreness in the area than usual. At her wedding in Sept, he tried to pick his daughter up and felt a sharp pain and almost a snap in the right arm. Now when he supinates or tries to  a cup he gets sharp pain in the elbow. He does notice some clicking in the elbow at times. At times he feels pain in his bicep as well. He denies numbness, tingling, or burning. Self massage and ice improve his symptoms. He takes ibuprofen as needed.       Patient Occupation: Desk job Quality of life: excellent    Pain  Current pain ratin  At worst pain rating: 10  Quality: sharp  Relieving factors: medications and ice  Aggravating factors: prolonged positioning, sleeping and repetitive movement  Progression: no change    Hand dominance: right    Diagnostic Tests  No diagnostic tests performed    Patient Goals  Patient goals for therapy: decreased pain, increased motion, increased strength, independence with ADLs/IADLs and return to sport/leisure activities             Objective          Palpation     Right Tenderness of the supinator.     Tenderness     Right Elbow   Tenderness in the radial head.     Active Range of Motion   Left Shoulder   Flexion: WFL  Abduction: WFL    Right Shoulder   Flexion: WFL  Abduction: WFL    Left Elbow   Flexion: WFL  Extension: WFL    Right Elbow   Flexion: WFL  Extension: WFL    Left Wrist   Wrist flexion: 76 degrees   Wrist extension: 70 degrees     Right Wrist   Wrist flexion: 72 degrees   Wrist extension: 64 degrees     Strength/Myotome Testing     Left Elbow   Flexion: 5  Extension: 5    Right Elbow   Flexion: 5  Extension: 5    Left Wrist/Hand   Radial deviation:  5  Ulnar deviation: 5     (2nd hand position)   Left  strength (2nd hand position) 99 lbs    Right Wrist/Hand   Radial deviation: 5  Ulnar deviation: 5     (2nd hand position)   Right  strength (2nd hand position) 105 lbs    Additional Strength Details  Pronation strength left: 5/5  Supination strength left: 5/5  Pronation strength right: 5/5  Supination strength right: 4/5 (pain)          Assessment & Plan     Assessment  Impairments: abnormal muscle firing, abnormal muscle tone, abnormal or restricted ROM, activity intolerance, impaired physical strength, lacks appropriate home exercise program and pain with function  Functional Limitations: lifting, sleeping and uncomfortable because of pain  Assessment details: Pt is a 56 year old male presenting to the clinic with right elbow pain. Signs and symptoms consistent with radial head and supinator involvement. He has slightly decreased wrist AROM, decreased supinator strength, and pain with palpation and resistance to the supinator muscle. His impairments are limiting his ability to lift items and sleep without pain. Pt would benefit from skilled PT to address his impairments so he can reach his long term goals.   Prognosis: good    Goals  Plan Goals: Short Term Goals: 2 weeks  1. Pt to demonstrate independence with HEP  2. Pt to demonstrate full ROM of the left wrist into flexion and extension  3. Pt to demonstrate ability to perform 30 min continuous activity in the clinic without exacerbation of symptoms     Long Term Goals: 6 weeks  1. Pt to demonstrate 5/5 strength of the supinator muscle  2. Pt to report being able to use the right arm for all ADL's without exacerbation of symptoms  3. Pt to demonstrate ability to perform 60 minutes continuous activity in the clinic without exacerbation of symptoms     Plan  Therapy options: will be seen for skilled therapy services  Planned modality interventions: cryotherapy, dry needling, electrical  stimulation/[place] stimulation, high voltage pulsed current (pain management), TENS and thermotherapy (hydrocollator packs)  Planned therapy interventions: functional ROM exercises, manual therapy, neuromuscular re-education, body mechanics training, fine motor coordination training, home exercise program, joint mobilization, postural training, soft tissue mobilization, spinal/joint mobilization, stretching, strengthening and therapeutic activities  Duration in visits: 1  Duration in weeks: 12  Treatment plan discussed with: patient        Manual Therapy:    8     mins  57547;  Therapeutic Exercise:    8     mins  16637;     Neuromuscular Martin:        mins  35926;    Therapeutic Activity:          mins  80705;     Gait Training:           mins  97040;     Ultrasound:          mins  19323;    Electrical Stimulation:         mins  34069 ( );  Dry Needling          mins self-pay    Timed Treatment:   16   mins   Total Treatment:     52   mins    PT SIGNATURE: Xi Kelly, PT   DATE TREATMENT INITIATED: 5/11/2022    Initial Certification  Certification Period: 5/11/2022 thru 8/8/2022  I certify that the therapy services are furnished while this patient is under my care.  The services outlined above are required by this patient, and will be reviewed every 90 days.     PHYSICIAN: Felipe Morse MD      DATE:     Please sign and return via fax to [phone].. Thank you, [facility].

## 2022-05-13 DIAGNOSIS — G47.33 OSA (OBSTRUCTIVE SLEEP APNEA): ICD-10-CM

## 2022-05-13 NOTE — TELEPHONE ENCOUNTER
LOV for chronic condition  NOV     HUBB please schedule follow up  Appointment   Left message to call back . Needs follow up appt for scheduled     Office # given

## 2022-05-17 RX ORDER — MODAFINIL 200 MG/1
200 TABLET ORAL DAILY
Qty: 90 TABLET | Refills: 2 | Status: SHIPPED | OUTPATIENT
Start: 2022-05-17 | End: 2022-09-08 | Stop reason: SDUPTHER

## 2022-05-18 ENCOUNTER — TREATMENT (OUTPATIENT)
Dept: PHYSICAL THERAPY | Facility: CLINIC | Age: 57
End: 2022-05-18

## 2022-05-18 DIAGNOSIS — M25.521 RIGHT ELBOW PAIN: Primary | ICD-10-CM

## 2022-05-18 PROCEDURE — 97110 THERAPEUTIC EXERCISES: CPT | Performed by: PHYSICAL THERAPIST

## 2022-05-18 PROCEDURE — 20561 NDL INSJ W/O NJX 3+ MUSC: CPT | Performed by: PHYSICAL THERAPIST

## 2022-05-18 PROCEDURE — 97112 NEUROMUSCULAR REEDUCATION: CPT | Performed by: PHYSICAL THERAPIST

## 2022-05-18 NOTE — PROGRESS NOTES
Physical Therapy Daily Progress Note    Subjective   Hasmukh Leon reports: he feels about the same overall. He has been doing his home exercises.     Objective   See Exercise, Manual, and Modality Logs for complete treatment.       Assessment/Plan  Pt tolerated treatment well. Dry needling was performed without adverse effects. He was educated on an updated HEP. Pt would benefit from continued skilled PT.    Progress per Plan of Care           Manual Therapy:         mins  09208;  Therapeutic Exercise:    12     mins  79091;     Neuromuscular Martin:    15    mins  11031;    Therapeutic Activity:          mins  24032;     Gait Training:           mins  62816;     Ultrasound:          mins  69049;    Electrical Stimulation:         mins  24145 ( );  E-Stim Attended:         mins  39792  Iontophoresis          mins 70504   Traction          mins  92579  Fluidotherapy          mins  35047  Dry Needling     20     mins self-pay - No Charge  Paraffin          mins  45233    Timed Treatment:   27   mins   Total Treatment:     47   mins    Xi Kelly, PT, DPT  Physical Therapist

## 2022-05-25 ENCOUNTER — TREATMENT (OUTPATIENT)
Dept: PHYSICAL THERAPY | Facility: CLINIC | Age: 57
End: 2022-05-25

## 2022-05-25 DIAGNOSIS — M25.521 RIGHT ELBOW PAIN: Primary | ICD-10-CM

## 2022-05-25 PROCEDURE — 97110 THERAPEUTIC EXERCISES: CPT | Performed by: PHYSICAL THERAPIST

## 2022-05-25 PROCEDURE — 97140 MANUAL THERAPY 1/> REGIONS: CPT | Performed by: PHYSICAL THERAPIST

## 2022-05-25 PROCEDURE — 97112 NEUROMUSCULAR REEDUCATION: CPT | Performed by: PHYSICAL THERAPIST

## 2022-05-25 NOTE — PROGRESS NOTES
Physical Therapy Daily Progress Note    Subjective   Hasmukh Leon reports: he thinks he has improved 10-20% since starting PT. He was sore after the dry needling but did not notice a big difference in his pain level.      Objective   See Exercise, Manual, and Modality Logs for complete treatment.       Assessment/Plan   Pt tolerated treatment well. He had mod pain with radial deviation and resisted supination. Pt would benefit from continued skilled PT.    Progress per Plan of Care           Manual Therapy:    10     mins  71466;  Therapeutic Exercise:    10     mins  39855;     Neuromuscular Martin:    10    mins  72233;    Therapeutic Activity:          mins  34943;     Gait Training:           mins  44869;     Ultrasound:          mins  40655;    Electrical Stimulation:         mins  48771 ( );  E-Stim Attended:         mins  25337  Iontophoresis          mins 72254   Traction          mins  84053  Fluidotherapy          mins  40316  Dry Needling          mins self-pay - No Charge  Paraffin          mins  92012    Timed Treatment:   30   mins   Total Treatment:     48   mins    Xi Kelly, PT, DPT  Physical Therapist

## 2022-06-02 ENCOUNTER — TREATMENT (OUTPATIENT)
Dept: PHYSICAL THERAPY | Facility: CLINIC | Age: 57
End: 2022-06-02

## 2022-06-02 DIAGNOSIS — M25.521 RIGHT ELBOW PAIN: Primary | ICD-10-CM

## 2022-06-02 PROCEDURE — 97112 NEUROMUSCULAR REEDUCATION: CPT | Performed by: PHYSICAL THERAPIST

## 2022-06-02 PROCEDURE — 97110 THERAPEUTIC EXERCISES: CPT | Performed by: PHYSICAL THERAPIST

## 2022-06-02 NOTE — PROGRESS NOTES
Physical Therapy Daily Progress Note    Subjective   Hasmukh Leon reports: he feels about 10-20% better since her started PT. He continues to have sharp pains while reaching for things or holding heavier items away from his body, especially in the morning. He notes that going to grab his coffee pot will typically cause this sharp pain. His arm feels better when he is warmed up, but he continues to get sharp pain every day.    Today's Pain ratin/10  Worst: 10/10    QD: 32.5    Objective          Active Range of Motion     Right Wrist   Wrist flexion: 72 degrees   Wrist extension: 66 degrees     Strength/Myotome Testing     Right Elbow   Forearm supination: 4+ (pain)  Forearm pronation: 5    Left Wrist/Hand      (2nd hand position)   Left  strength (2nd hand position) 95 lbs    Right Wrist/Hand   Wrist extension: 5  Wrist flexion: 5  Radial deviation: 5  Ulnar deviation: 5     (2nd hand position)   Right  strength (2nd hand position) 106 lbs      See Exercise, Manual, and Modality Logs for complete treatment.       Assessment/Plan   Pt is a 56 year old male who has come to PT for 4 visits for R elbow pain. His pain remains unchanged at this time, although he does feel the exercises have helped with strength. His AROM and  strength are about the same. His supination strength has improved. Since his pain remains unchanged and is sharp, I will recommend further imaging. He was instructed to continue his HEP to continue working on strength.     Awaiting MD orders           Manual Therapy:         mins  47000;  Therapeutic Exercise:    23     mins  47269;     Neuromuscular Martin:    15    mins  20213;    Therapeutic Activity:          mins  47917;     Gait Training:           mins  13650;     Ultrasound:          mins  22210;    Electrical Stimulation:         mins  29575 ( );  E-Stim Attended:        mins  66902  Iontophoresis          mins 80512   Traction          mins   52643  Fluidotherapy          mins  71512  Dry Needling         mins self-pay - No Charge  Paraffin          mins  84200    Timed Treatment:   38   mins   Total Treatment:     38   mins    Xi Kelly, PT, DPT  Physical Therapist

## 2022-06-19 DIAGNOSIS — M25.521 RIGHT ELBOW PAIN: Primary | ICD-10-CM

## 2022-06-20 ENCOUNTER — HOSPITAL ENCOUNTER (OUTPATIENT)
Dept: GENERAL RADIOLOGY | Facility: HOSPITAL | Age: 57
Discharge: HOME | End: 2022-06-20
Admitting: INTERNAL MEDICINE

## 2022-06-20 DIAGNOSIS — M25.521 RIGHT ELBOW PAIN: ICD-10-CM

## 2022-06-20 PROCEDURE — 73070 X-RAY EXAM OF ELBOW: CPT

## 2022-06-20 PROCEDURE — 73090 X-RAY EXAM OF FOREARM: CPT

## 2022-08-04 DIAGNOSIS — M25.521 RIGHT ELBOW PAIN: Primary | ICD-10-CM

## 2022-08-25 ENCOUNTER — TELEPHONE (OUTPATIENT)
Dept: INTERNAL MEDICINE | Facility: CLINIC | Age: 57
End: 2022-08-25

## 2022-09-01 ENCOUNTER — OFFICE VISIT (OUTPATIENT)
Dept: INTERNAL MEDICINE | Facility: CLINIC | Age: 57
End: 2022-09-01

## 2022-09-01 ENCOUNTER — LAB (OUTPATIENT)
Dept: LAB | Facility: HOSPITAL | Age: 57
End: 2022-09-01

## 2022-09-01 VITALS
SYSTOLIC BLOOD PRESSURE: 124 MMHG | WEIGHT: 315 LBS | TEMPERATURE: 97.8 F | DIASTOLIC BLOOD PRESSURE: 84 MMHG | RESPIRATION RATE: 20 BRPM | BODY MASS INDEX: 47.4 KG/M2 | HEART RATE: 76 BPM

## 2022-09-01 DIAGNOSIS — Z12.5 SCREENING PSA (PROSTATE SPECIFIC ANTIGEN): ICD-10-CM

## 2022-09-01 DIAGNOSIS — M25.521 RIGHT ELBOW PAIN: Primary | ICD-10-CM

## 2022-09-01 DIAGNOSIS — I10 PRIMARY HYPERTENSION: ICD-10-CM

## 2022-09-01 LAB
DEPRECATED RDW RBC AUTO: 38.6 FL (ref 37–54)
ERYTHROCYTE [DISTWIDTH] IN BLOOD BY AUTOMATED COUNT: 12.8 % (ref 12.3–15.4)
HCT VFR BLD AUTO: 45.6 % (ref 37.5–51)
HGB BLD-MCNC: 15.6 G/DL (ref 13–17.7)
MCH RBC QN AUTO: 28.6 PG (ref 26.6–33)
MCHC RBC AUTO-ENTMCNC: 34.2 G/DL (ref 31.5–35.7)
MCV RBC AUTO: 83.7 FL (ref 79–97)
PLATELET # BLD AUTO: 316 10*3/MM3 (ref 140–450)
PMV BLD AUTO: 9.7 FL (ref 6–12)
RBC # BLD AUTO: 5.45 10*6/MM3 (ref 4.14–5.8)
WBC NRBC COR # BLD: 7.01 10*3/MM3 (ref 3.4–10.8)

## 2022-09-01 PROCEDURE — 99214 OFFICE O/P EST MOD 30 MIN: CPT | Performed by: INTERNAL MEDICINE

## 2022-09-01 PROCEDURE — G0103 PSA SCREENING: HCPCS | Performed by: INTERNAL MEDICINE

## 2022-09-01 PROCEDURE — 36415 COLL VENOUS BLD VENIPUNCTURE: CPT | Performed by: INTERNAL MEDICINE

## 2022-09-01 PROCEDURE — 80061 LIPID PANEL: CPT | Performed by: INTERNAL MEDICINE

## 2022-09-01 PROCEDURE — 80050 GENERAL HEALTH PANEL: CPT | Performed by: INTERNAL MEDICINE

## 2022-09-01 PROCEDURE — 84439 ASSAY OF FREE THYROXINE: CPT | Performed by: INTERNAL MEDICINE

## 2022-09-02 LAB
ALBUMIN SERPL-MCNC: 4.3 G/DL (ref 3.5–5.2)
ALBUMIN/GLOB SERPL: 1.6 G/DL
ALP SERPL-CCNC: 91 U/L (ref 39–117)
ALT SERPL W P-5'-P-CCNC: 42 U/L (ref 1–41)
ANION GAP SERPL CALCULATED.3IONS-SCNC: 13.8 MMOL/L (ref 5–15)
AST SERPL-CCNC: 29 U/L (ref 1–40)
BILIRUB SERPL-MCNC: 0.3 MG/DL (ref 0–1.2)
BUN SERPL-MCNC: 14 MG/DL (ref 6–20)
BUN/CREAT SERPL: 14.6 (ref 7–25)
CALCIUM SPEC-SCNC: 9.7 MG/DL (ref 8.6–10.5)
CHLORIDE SERPL-SCNC: 97 MMOL/L (ref 98–107)
CHOLEST SERPL-MCNC: 171 MG/DL (ref 0–200)
CO2 SERPL-SCNC: 29.2 MMOL/L (ref 22–29)
CREAT SERPL-MCNC: 0.96 MG/DL (ref 0.76–1.27)
EGFRCR SERPLBLD CKD-EPI 2021: 92.8 ML/MIN/1.73
GLOBULIN UR ELPH-MCNC: 2.7 GM/DL
GLUCOSE SERPL-MCNC: 93 MG/DL (ref 65–99)
HDLC SERPL-MCNC: 36 MG/DL (ref 40–60)
LDLC SERPL CALC-MCNC: 94 MG/DL (ref 0–100)
LDLC/HDLC SERPL: 2.39 {RATIO}
POTASSIUM SERPL-SCNC: 3.8 MMOL/L (ref 3.5–5.2)
PROT SERPL-MCNC: 7 G/DL (ref 6–8.5)
PSA SERPL-MCNC: 0.51 NG/ML (ref 0–4)
SODIUM SERPL-SCNC: 140 MMOL/L (ref 136–145)
T4 FREE SERPL-MCNC: 1.11 NG/DL (ref 0.93–1.7)
TRIGL SERPL-MCNC: 244 MG/DL (ref 0–150)
TSH SERPL DL<=0.05 MIU/L-ACNC: 1.61 UIU/ML (ref 0.27–4.2)
VLDLC SERPL-MCNC: 41 MG/DL (ref 5–40)

## 2022-09-06 NOTE — PROGRESS NOTES
Chief Complaint  Med Refill    Subjective    Hasmukh Leon is a 56 y.o. male.     Hasmukh Leon presents to [facility] for     History of Present Illness    Hypertension - The patient reports that her blood pressures were unexpectedly elevated without having any lightheadedness or dizziness. She reports that her blood pressures have been 200/100 mmHg, 180/100 mmHg, and the lowest was on 09/01/2022 at 150/90 mmHg. She denies any chest pain or shortness of breath. She states that she took 2 tablets of carvedilol 2 times daily and 2 Altace until she can be checked here at the clinic. She denies having any unusual stress or change in salt consumption. She reports that she rarely drinks now and not even once a week.    The following portions of the patient's history were reviewed and updated as appropriate: allergies, current medications, past family history, past medical history, past social history, past surgical history and problem list.    Review of Systems:  A review of systems was performed, and pertinent findings are noted in the HPI.    Objective   Vital Signs:   /84 (BP Location: Right arm, Patient Position: Sitting, Cuff Size: Adult)   Pulse 76   Temp 97.8 °F (36.6 °C) (Temporal)   Resp 20   Wt (!) 150 kg (330 lb 6 oz)   BMI 47.40 kg/m²     Body mass index is 47.4 kg/m².    Physical Exam  Constitutional:       General: He is not in acute distress.  HENT:      Head: Normocephalic and atraumatic.   Eyes:      Extraocular Movements: Extraocular movements intact.      Pupils: Pupils are equal, round, and reactive to light.   Cardiovascular:      Heart sounds: S1 normal and S2 normal. No murmur heard.    No friction rub. No gallop.   Pulmonary:      Effort: Pulmonary effort is normal.      Breath sounds: No wheezing or rhonchi.   Chest:      Comments: C  Musculoskeletal:      Cervical back: Neck supple.   Lymphadenopathy:      Cervical: No cervical  adenopathy.   Skin:     General: Skin is moist.   Neurological:      Mental Status: He is alert.               Assessment and Plan  Diagnoses and all orders for this visit:    Hypertension, uncontrolled.  - The patient is here with review of blood pressure management as well as readings. After review of history, physical, and today's vitals and findings, I strongly recommend adding another medication to the patient's regimen.  - I will go ahead and continue her on the Coreg 25 mg 1 tablet by mouth twice daily and Altace 1 tablet by mouth once daily.  - Additional medication that will be given is chlorthalidone 25 mg 1 tablet by mouth once daily.  I have explained side effects and precautions with the patient and we will continue to monitor that.  - Otherwise, everything looks good.  - I would like to see the patient back in 4 weeks in order to see how her blood pressure is doing as it relates to medication changes.    2. Diabetes  - The patient will continue on current medications for her diabetes.        Follow Up   Return in about 6 months (around 3/1/2023), or schedule for complete physical.  Patient was given instructions and counseling regarding his condition or for health maintenance advice. Please see specific information pulled into the AVS if appropriate.       Transcribed from ambient dictation for Felipe Morse MD by aMury Borja.  09/06/22   14:12 EDT    Patient verbalized consent to the visit recording.  I have personally performed the services described in this document as transcribed by the above individual, and it is both accurate and complete.  Felipe Morse MD  9/18/2022  15:09 EDT

## 2022-09-08 ENCOUNTER — TELEMEDICINE (OUTPATIENT)
Dept: NEUROLOGY | Facility: CLINIC | Age: 57
End: 2022-09-08

## 2022-09-08 DIAGNOSIS — G93.32 CHRONIC FATIGUE SYNDROME: Chronic | ICD-10-CM

## 2022-09-08 DIAGNOSIS — G47.61 PERIODIC LIMB MOVEMENT DISORDER: Chronic | ICD-10-CM

## 2022-09-08 DIAGNOSIS — G47.33 OSA (OBSTRUCTIVE SLEEP APNEA): Primary | Chronic | ICD-10-CM

## 2022-09-08 PROCEDURE — 99212 OFFICE O/P EST SF 10 MIN: CPT | Performed by: PSYCHIATRY & NEUROLOGY

## 2022-09-08 RX ORDER — MODAFINIL 200 MG/1
200 TABLET ORAL DAILY
Qty: 90 TABLET | Refills: 2 | Status: SHIPPED | OUTPATIENT
Start: 2022-09-08

## 2022-09-08 NOTE — PROGRESS NOTES
Chief Complaint      AMRITA      Subjective         Hasmukh Leon presents to [facility]     History of Present Illness     56 y.o. male returns in follow up.  Last visit on 9/25/2020 continued CPAP and Provigil.     DME:  [employer]     Requesting new machine.      Compliant with CPAP and receiving benefit.       Provigil 100 mg 10:30 am daily and 100 mg qpm prn .         Changing mask and supplies on scheduled intervals..    Objective     Physical Exam   Constitutional: He is oriented to person, place, and time.   Eyes: EOM are normal.   Neurological: He is oriented to person, place, and time.   Psychiatric: His speech is normal.        Neurologic Exam     Mental Status   Oriented to person, place, and time.   Attention: normal. Concentration: normal.   Speech: speech is normal   Level of consciousness: alert  Knowledge: good.   Normal comprehension.     Cranial Nerves     CN III, IV, VI   Extraocular motions are normal.     CN VII   Facial expression full, symmetric.     CN VIII   CN VIII normal.     Motor Exam MAEW      Result Review :   The following data was reviewed by: Jorge Frost MD on 09/08/2022:  Common labs    Common Labsle 10/15/21 9/1/22 9/1/22 9/1/22 9/1/22     1228 1228 1228 1228   Glucose    93    BUN    14    Creatinine    0.96    Sodium    140    Potassium    3.8    Chloride    97 (A)    Calcium    9.7    Albumin    4.30    Total Bilirubin    0.3    Alkaline Phosphatase    91    AST (SGOT)    29    ALT (SGPT)    42 (A)    WBC  7.01      Hemoglobin  15.6      Hematocrit  45.6      Platelets  316      Total Cholesterol     171   Triglycerides     244 (A)   HDL Cholesterol     36 (A)   LDL Cholesterol      94   PSA   0.506     Uric Acid 5.9       (A) Abnormal value                      Assessment and Plan    Diagnoses and all orders for this visit:    1. AMRITA (obstructive sleep apnea) (Primary)  Assessment & Plan:  Renew CPAP mask and supplies       Orders:  -      modafinil (PROVIGIL) 200 MG tablet; Take 1 tablet by mouth Daily.  Dispense: 90 tablet; Refill: 2    2. Periodic limb movement disorder    3. Chronic fatigue syndrome  Assessment & Plan:  Provigil         Follow Up   No follow-ups on file.  Patient was given instructions and counseling regarding his condition or for health maintenance advice. Please see specific information pulled into the AVS if appropriate.     Mode of Visit: Video  Location of patient: home  [place] [facility] [place] [facility]  You have chosen to receive care through a telehealth visit.  The patient has signed the video visit consent form.  The visit included audio and video interaction. No technical issues occurred during this visit.

## 2022-09-14 ENCOUNTER — HOSPITAL ENCOUNTER (OUTPATIENT)
Dept: MRI IMAGING | Facility: HOSPITAL | Age: 57
Discharge: HOME | End: 2022-09-14

## 2022-09-14 DIAGNOSIS — M25.521 RIGHT ELBOW PAIN: ICD-10-CM

## 2022-09-14 PROCEDURE — 73221 MRI JOINT UPR EXTREM W/O DYE: CPT

## 2022-09-14 PROCEDURE — 73218 MRI UPPER EXTREMITY W/O DYE: CPT

## 2022-09-24 ENCOUNTER — DOCUMENTATION (OUTPATIENT)
Dept: INTERNAL MEDICINE | Facility: CLINIC | Age: 57
End: 2022-09-24

## 2022-09-24 RX ORDER — BENZONATATE 200 MG/1
200 CAPSULE ORAL 3 TIMES DAILY PRN
Qty: 30 CAPSULE | Refills: 0 | Status: SHIPPED | OUTPATIENT
Start: 2022-09-24

## 2022-11-02 DIAGNOSIS — M25.521 RIGHT ELBOW PAIN: Primary | ICD-10-CM

## 2022-12-15 DIAGNOSIS — I10 PRIMARY HYPERTENSION: ICD-10-CM

## 2022-12-15 RX ORDER — HYDROCHLOROTHIAZIDE 12.5 MG/1
12.5 TABLET ORAL DAILY
Qty: 90 TABLET | Refills: 2 | Status: SHIPPED | OUTPATIENT
Start: 2022-12-15 | End: 2023-03-16 | Stop reason: SDUPTHER

## 2023-01-30 DIAGNOSIS — K21.9 GASTROESOPHAGEAL REFLUX DISEASE WITHOUT ESOPHAGITIS: ICD-10-CM

## 2023-01-30 DIAGNOSIS — E78.5 HYPERLIPIDEMIA, UNSPECIFIED HYPERLIPIDEMIA TYPE: ICD-10-CM

## 2023-01-30 DIAGNOSIS — I10 PRIMARY HYPERTENSION: ICD-10-CM

## 2023-01-30 DIAGNOSIS — F32.A DEPRESSION, UNSPECIFIED DEPRESSION TYPE: ICD-10-CM

## 2023-01-31 RX ORDER — AMLODIPINE BESYLATE 10 MG/1
10 TABLET ORAL DAILY
Qty: 90 TABLET | Refills: 2 | Status: SHIPPED | OUTPATIENT
Start: 2023-01-31 | End: 2023-03-16 | Stop reason: SDUPTHER

## 2023-01-31 RX ORDER — DESVENLAFAXINE 100 MG/1
100 TABLET, EXTENDED RELEASE ORAL DAILY
Qty: 90 TABLET | Refills: 3 | Status: SHIPPED | OUTPATIENT
Start: 2023-01-31 | End: 2023-03-16 | Stop reason: SDUPTHER

## 2023-01-31 RX ORDER — ATORVASTATIN CALCIUM 10 MG/1
10 TABLET, FILM COATED ORAL DAILY
Qty: 90 TABLET | Refills: 3 | Status: SHIPPED | OUTPATIENT
Start: 2023-01-31

## 2023-01-31 RX ORDER — PANTOPRAZOLE SODIUM 40 MG/1
40 TABLET, DELAYED RELEASE ORAL DAILY
Qty: 90 TABLET | Refills: 3 | Status: SHIPPED | OUTPATIENT
Start: 2023-01-31 | End: 2023-03-16 | Stop reason: SDUPTHER

## 2023-03-16 ENCOUNTER — OFFICE VISIT (OUTPATIENT)
Dept: INTERNAL MEDICINE | Facility: CLINIC | Age: 58
End: 2023-03-16
Payer: COMMERCIAL

## 2023-03-16 VITALS
HEART RATE: 71 BPM | DIASTOLIC BLOOD PRESSURE: 80 MMHG | WEIGHT: 315 LBS | RESPIRATION RATE: 20 BRPM | TEMPERATURE: 97.1 F | SYSTOLIC BLOOD PRESSURE: 122 MMHG | BODY MASS INDEX: 48.21 KG/M2

## 2023-03-16 DIAGNOSIS — I10 PRIMARY HYPERTENSION: ICD-10-CM

## 2023-03-16 DIAGNOSIS — E55.9 VITAMIN D DEFICIENCY: ICD-10-CM

## 2023-03-16 DIAGNOSIS — F32.A DEPRESSION, UNSPECIFIED DEPRESSION TYPE: ICD-10-CM

## 2023-03-16 DIAGNOSIS — R53.83 OTHER FATIGUE: Primary | ICD-10-CM

## 2023-03-16 DIAGNOSIS — E29.1 HYPOGONADISM IN MALE: ICD-10-CM

## 2023-03-16 DIAGNOSIS — K21.9 GASTROESOPHAGEAL REFLUX DISEASE WITHOUT ESOPHAGITIS: ICD-10-CM

## 2023-03-16 DIAGNOSIS — S56.911A FOREARM STRAIN, RIGHT, INITIAL ENCOUNTER: ICD-10-CM

## 2023-03-16 DIAGNOSIS — M79.645 PAIN OF LEFT THUMB: ICD-10-CM

## 2023-03-16 LAB
25(OH)D3 SERPL-MCNC: 14.6 NG/ML (ref 30–100)
FSH SERPL-ACNC: 2.62 MIU/ML
LH SERPL-ACNC: 2.61 MIU/ML
PROLACTIN SERPL-MCNC: 8.5 NG/ML (ref 4.04–15.2)
T4 FREE SERPL-MCNC: 1.23 NG/DL (ref 0.93–1.7)
TSH SERPL DL<=0.05 MIU/L-ACNC: 3.78 UIU/ML (ref 0.27–4.2)

## 2023-03-16 PROCEDURE — 84146 ASSAY OF PROLACTIN: CPT | Performed by: INTERNAL MEDICINE

## 2023-03-16 PROCEDURE — 99214 OFFICE O/P EST MOD 30 MIN: CPT | Performed by: INTERNAL MEDICINE

## 2023-03-16 PROCEDURE — 83002 ASSAY OF GONADOTROPIN (LH): CPT | Performed by: INTERNAL MEDICINE

## 2023-03-16 PROCEDURE — 82306 VITAMIN D 25 HYDROXY: CPT | Performed by: INTERNAL MEDICINE

## 2023-03-16 PROCEDURE — 83001 ASSAY OF GONADOTROPIN (FSH): CPT | Performed by: INTERNAL MEDICINE

## 2023-03-16 PROCEDURE — 84443 ASSAY THYROID STIM HORMONE: CPT | Performed by: INTERNAL MEDICINE

## 2023-03-16 PROCEDURE — 84439 ASSAY OF FREE THYROXINE: CPT | Performed by: INTERNAL MEDICINE

## 2023-03-16 PROCEDURE — 84402 ASSAY OF FREE TESTOSTERONE: CPT | Performed by: INTERNAL MEDICINE

## 2023-03-16 PROCEDURE — 84403 ASSAY OF TOTAL TESTOSTERONE: CPT | Performed by: INTERNAL MEDICINE

## 2023-03-16 RX ORDER — PANTOPRAZOLE SODIUM 40 MG/1
40 TABLET, DELAYED RELEASE ORAL DAILY
Qty: 90 TABLET | Refills: 0 | Status: SHIPPED | OUTPATIENT
Start: 2023-03-16

## 2023-03-16 RX ORDER — DESVENLAFAXINE 100 MG/1
100 TABLET, EXTENDED RELEASE ORAL DAILY
Qty: 90 TABLET | Refills: 1 | Status: SHIPPED | OUTPATIENT
Start: 2023-03-16

## 2023-03-16 RX ORDER — HYDROCHLOROTHIAZIDE 12.5 MG/1
12.5 TABLET ORAL DAILY
Qty: 90 TABLET | Refills: 1 | Status: SHIPPED | OUTPATIENT
Start: 2023-03-16

## 2023-03-16 RX ORDER — AMLODIPINE BESYLATE 10 MG/1
10 TABLET ORAL DAILY
Qty: 90 TABLET | Refills: 1 | Status: SHIPPED | OUTPATIENT
Start: 2023-03-16

## 2023-03-16 NOTE — PROGRESS NOTES
Chief Complaint  Hand Pain (Left thumb pain) and Labs Only    Subjective    Hasmukh Leon is a 57 y.o. male.     Hasmukh Leon presents to [facility] for hand pain in the left thumb and follow-up on labs.      History of Present Illness    1. Fatigue. - The patient reports that he would like to get his blood work done and to have his testosterone checked. He states that he is always tired. He reports that he has sleep apnea and uses his  continuous positive airway pressure machine. He states that it works well. He reports that someone told him that his testosterone was low and that fixed the problem once they addressed that.    2. Left thumb pain. - The patient reports that he had surgery on his tendon that was torn. He states that he is having trouble turning his hand supine. He reports that he has been seeing a physical therapist. He states that his current surgeon seems to be concerned about it and does not seem to know a solution as of yet. He reports that he woke up one day and his left thumb was caught and stuck. He states that it has been that way ever since. He reports that it is popping and it is worse when he wakes up. He states that there is a nodule on his thumb. He reports that he types every day.    3. Hypertension. - The patient reports that his blood pressure medication is working. He denies any side effects or issues.    4. Depression. - The patient reports that he takes Provigil as needed. He states that some days he wants to take a nap so he does not take it. He reports that he takes it at 10:00 AM. He states he takes it in the morning so he does not stay awake at night.    5. Low libido. - The patient reports that he has a decrease in sex drive.        The following portions of the patient's history were reviewed and updated as appropriate: allergies, current medications, past family history, past medical history, past social history,  past surgical history and problem list.      Review of Systems    A review of systems was performed, and pertinent findings are noted in the HPI.      Objective   Vital Signs:   /80 (BP Location: Right arm, Patient Position: Sitting, Cuff Size: Adult)   Pulse 71   Temp 97.1 °F (36.2 °C) (Temporal)   Resp 20   Wt (!) 152 kg (336 lb)   BMI 48.21 kg/m²     Body mass index is 48.21 kg/m².    Physical Exam  Constitutional:       General: He is not in acute distress.  HENT:      Head: Normocephalic and atraumatic.      Mouth/Throat:      Mouth: Mucous membranes are moist.   Eyes:      Extraocular Movements: Extraocular movements intact.      Pupils: Pupils are equal, round, and reactive to light. Pupils are equal.   Cardiovascular:      Heart sounds: S1 normal and S2 normal. No murmur heard.    No friction rub. No gallop.   Pulmonary:      Breath sounds: No wheezing or rhonchi.   Chest:      Comments: clear to auscultation  Musculoskeletal:      Comments:  patient does have some clicking and also limited range of motion in the left thumb consistent with possible tendinitis and maybe even a small palpable ganglion cyst is palpated in this region too.  Patient's elbow, forearm where he had surgery he has been attending physical therapy and this has improved with a range of motion degree from 5% to 20%.   Neurological:      Mental Status: He is alert and oriented to person, place, and time.                       Assessment and Plan  Diagnoses and all orders for this visit:     1. Fatigue and decreased sex drive with concerns of possible low testosterone or hypogonadism.  - We will go ahead and initiate that screening here today.  - We will be doing a total testosterone, free testosterone, Luteinizing hormone, Follicle-stimulating hormone, prolactin levels will also be including the work-up of that as well.    2. Chronic fatigue.  - I am also going to check thyroid, Thyroid-stimulating hormone, free T4, and also  concerns of possible low vitamin D, so we will also do a vitamin D level as well.  - Also discussed with patient in conjunction with his sleep apnea, he is using his continuous positive airway pressure machine and current no change in his pressures and overall doing well in that regards, so that has been checked here too.    3. Medications.  - I refilled his medications.    4. Depression.  - He is continued on the Pristiq 100 mg, which has been doing well for him.    5. Reflux.  - Continue on the Protonix 40 mg 1 tablet by mouth once a day.    6. Left thumb pain.  - I did discuss the possibilities of physical therapy to help with what seems to be mild tendinitis or concerns of ganglion cyst or topical anti-inflammatory with Voltaren gel.  - Patient has elected to do the Voltaren gel and so what application at least 3 to 4 times a day in the thumb area and we will monitor his response in that regards here with possible considerations, referral to hand surgeons if needed.    7. Follow up.  - Orthopedics has made recommendations of following up in 3 months post physical therapy to see how he is doing, so I also agreed that physical therapy may take some time to increase his range of motion and so patient has agreed to continue, at least a 3-month duration until follow up with orthopedics for reassessment.   - Otherwise, everything else looks good today. We will see patient back in next scheduled appointment as needed.        Follow Up   No follow-ups on file.  Patient was given instructions and counseling regarding his condition or for health maintenance advice. Please see specific information pulled into the AVS if appropriate.      Transcribed from ambient dictation for Felipe Morse MD by Radha Guthrie.  03/16/23   10:57 EDT    Patient or patient representative verbalized consent to the visit recording.  I have personally performed the services described in this document as transcribed by the above individual, and  it is both accurate and complete.

## 2023-03-21 DIAGNOSIS — M79.645 PAIN OF LEFT THUMB: Primary | ICD-10-CM

## 2023-03-22 DIAGNOSIS — E55.9 VITAMIN D DEFICIENCY: Primary | ICD-10-CM

## 2023-03-22 RX ORDER — ERGOCALCIFEROL 1.25 MG/1
50000 CAPSULE ORAL WEEKLY
Qty: 12 CAPSULE | Refills: 1 | Status: SHIPPED | OUTPATIENT
Start: 2023-03-22

## 2023-03-23 LAB
TESTOST FREE SERPL-MCNC: 5.2 PG/ML (ref 7.2–24)
TESTOST SERPL-MCNC: 333 NG/DL (ref 264–916)

## 2023-03-27 DIAGNOSIS — E29.1 HYPOGONADISM IN MALE: Primary | ICD-10-CM

## 2023-04-14 ENCOUNTER — LAB (OUTPATIENT)
Dept: INTERNAL MEDICINE | Facility: CLINIC | Age: 58
End: 2023-04-14
Payer: COMMERCIAL

## 2023-04-14 DIAGNOSIS — E29.1 HYPOGONADISM IN MALE: ICD-10-CM

## 2023-04-14 DIAGNOSIS — E53.8 VITAMIN B12 DEFICIENCY: ICD-10-CM

## 2023-04-14 DIAGNOSIS — E53.8 VITAMIN B12 DEFICIENCY: Primary | ICD-10-CM

## 2023-04-14 LAB — VIT B12 BLD-MCNC: 538 PG/ML (ref 211–946)

## 2023-04-14 PROCEDURE — 84402 ASSAY OF FREE TESTOSTERONE: CPT | Performed by: INTERNAL MEDICINE

## 2023-04-14 PROCEDURE — 82607 VITAMIN B-12: CPT | Performed by: INTERNAL MEDICINE

## 2023-04-14 PROCEDURE — 84403 ASSAY OF TOTAL TESTOSTERONE: CPT | Performed by: INTERNAL MEDICINE

## 2023-04-16 LAB
TESTOST FREE SERPL-MCNC: 9 PG/ML (ref 7.2–24)
TESTOST SERPL-MCNC: 333 NG/DL (ref 264–916)

## 2023-09-18 DIAGNOSIS — E55.9 VITAMIN D DEFICIENCY: ICD-10-CM

## 2023-09-18 RX ORDER — ERGOCALCIFEROL 1.25 MG/1
CAPSULE ORAL
Qty: 12 CAPSULE | Refills: 1 | Status: SHIPPED | OUTPATIENT
Start: 2023-09-18

## 2023-10-14 DIAGNOSIS — I10 PRIMARY HYPERTENSION: ICD-10-CM

## 2023-10-16 ENCOUNTER — TELEPHONE (OUTPATIENT)
Dept: NEUROLOGY | Facility: CLINIC | Age: 58
End: 2023-10-16
Payer: COMMERCIAL

## 2023-10-16 DIAGNOSIS — G47.33 OSA (OBSTRUCTIVE SLEEP APNEA): Primary | ICD-10-CM

## 2023-10-16 RX ORDER — HYDROCHLOROTHIAZIDE 12.5 MG/1
12.5 TABLET ORAL DAILY
Qty: 90 TABLET | Refills: 2 | Status: SHIPPED | OUTPATIENT
Start: 2023-10-16

## 2023-10-16 RX ORDER — AMLODIPINE BESYLATE 10 MG/1
10 TABLET ORAL DAILY
Qty: 90 TABLET | Refills: 2 | Status: SHIPPED | OUTPATIENT
Start: 2023-10-16

## 2023-10-16 NOTE — TELEPHONE ENCOUNTER
Sleep study was not done where form was sent and said he is not in their system.  [facility] [place].  They will need [facility] number when requesting.

## 2023-10-17 NOTE — TELEPHONE ENCOUNTER
Unable to obtain patients original sleep study.  Company needs it for the new machine.  Patient does not remember where he had it done.  I requested it through [employer]  but they say that it was not done here nor uploaded from pre-Epic days.  Do we do a new sleep study to get the equipment for the patient?

## 2023-10-23 ENCOUNTER — TELEMEDICINE (OUTPATIENT)
Dept: NEUROLOGY | Facility: CLINIC | Age: 58
End: 2023-10-23
Payer: COMMERCIAL

## 2023-10-23 DIAGNOSIS — G93.32 CHRONIC FATIGUE SYNDROME: Chronic | ICD-10-CM

## 2023-10-23 DIAGNOSIS — G47.61 PERIODIC LIMB MOVEMENT DISORDER: Chronic | ICD-10-CM

## 2023-10-23 DIAGNOSIS — G47.33 OSA (OBSTRUCTIVE SLEEP APNEA): Primary | Chronic | ICD-10-CM

## 2023-10-23 PROCEDURE — 99214 OFFICE O/P EST MOD 30 MIN: CPT | Performed by: PSYCHIATRY & NEUROLOGY

## 2023-10-23 NOTE — PROGRESS NOTES
"Chief Complaint    No chief complaint on file.    Subjective        Hasmukh Leon presents to [facility]  History of Present Illness    57 y.o. male returns in follow up.  Last visit on 9/8/22 continued CPAP and Provigil.      DME:  [employer]     Requesting new machine.     Continues to have loud snoring witnessed apnea, excessive daytime sleepiness.      Compliant with CPAP and receiving benefit.       Provigil 100 mg 10:30 am daily and 100 mg qpm prn .         Changing mask and supplies on scheduled intervals..      Objective   Vital Signs:  There were no vitals taken for this visit.  Estimated body mass index is 48.21 kg/m² as calculated from the following:    Height as of 12/17/21: 177.8 cm (70\").    Weight as of 3/16/23: 152 kg (336 lb).           Neurologic Exam     Mental Status   Oriented to person, place, and time.   Level of consciousness: alert  Knowledge: good.     Cranial Nerves     CN III, IV, VI   Extraocular motions are normal.     CN VII   Facial expression full, symmetric.     CN VIII   CN VIII normal.     Motor Exam MAEW        Physical Exam  Eyes:      Extraocular Movements: EOM normal.   Neurological:      Mental Status: He is oriented to person, place, and time.        Result Review :  The following data was reviewed by: Jorge Frost MD on 10/23/2023:                 Assessment and Plan   Diagnoses and all orders for this visit:    1. AMRITA (obstructive sleep apnea) (Primary)  Assessment & Plan:  Pt needs new CPAP    Home sleep study           2. Periodic limb movement disorder    3. Chronic fatigue syndrome  Assessment & Plan:  Improved on Provigil                Follow Up   No follow-ups on file.  Patient was given instructions and counseling regarding his condition or for health maintenance advice. Please see specific information pulled into the AVS if appropriate.         "

## 2023-11-03 ENCOUNTER — HOSPITAL ENCOUNTER (OUTPATIENT)
Dept: SLEEP MEDICINE | Facility: HOSPITAL | Age: 58
Discharge: HOME | End: 2023-11-03
Admitting: PSYCHIATRY & NEUROLOGY
Payer: COMMERCIAL

## 2023-11-03 VITALS — HEIGHT: 70 IN | BODY MASS INDEX: 45.1 KG/M2 | WEIGHT: 315 LBS

## 2023-11-03 DIAGNOSIS — G47.33 OSA (OBSTRUCTIVE SLEEP APNEA): ICD-10-CM

## 2023-11-03 PROCEDURE — 95800 SLP STDY UNATTENDED: CPT

## 2023-11-09 ENCOUNTER — TELEPHONE (OUTPATIENT)
Dept: INTERNAL MEDICINE | Facility: CLINIC | Age: 58
End: 2023-11-09
Payer: COMMERCIAL

## 2023-11-09 DIAGNOSIS — U07.1 COVID-19 VIRUS INFECTION: Primary | ICD-10-CM

## 2023-11-10 ENCOUNTER — TELEPHONE (OUTPATIENT)
Dept: NEUROLOGY | Facility: CLINIC | Age: 58
End: 2023-11-10
Payer: COMMERCIAL

## 2023-11-10 RX ORDER — ALBUTEROL SULFATE 90 UG/1
2 AEROSOL, METERED RESPIRATORY (INHALATION) EVERY 6 HOURS PRN
Qty: 8 G | Refills: 2 | Status: SHIPPED | OUTPATIENT
Start: 2023-11-10

## 2023-11-10 RX ORDER — NIRMATRELVIR AND RITONAVIR 300-100 MG
3 KIT ORAL 2 TIMES DAILY
Qty: 30 TABLET | Refills: 0 | Status: SHIPPED | OUTPATIENT
Start: 2023-11-10

## 2023-11-10 NOTE — TELEPHONE ENCOUNTER
Caller: Sindy Olvera    Relationship to patient: Emergency Contact    Best call back number: [phone]     Date of exposure: ??    Date of positive COVID19 test: 110923    Date if possible COVID19 exposure: ??    COVID19 symptoms: TIRED, CAN'T THINK, COUGH, 99.8 FEVER,     Date of initial quarantine: 032834    Additional information or concerns: WHAT CAN HE TAKE?  LAST TIME THERE WAS A PILL WE TOOK FOR 5 DAYS.     What is the patients preferred pharmacy:   [facility] #[id number] [address]  AT [facility] - [phone] [facility] [phone]  [phone]          
Clinical message relayed to wife. Good verbal understanding.   
I did call in an inhaler for him of albuterol but please make note that if his respiratory symptoms get worse he should proceed to the emergency room for evaluation  
Patient's wife called and was read the message from Dr. Morse. He needs to stop taking atorvastatin (LIPITOR) 10 MG tablet.  Patient is sleeping most of the day and night. He is extremely tired and has brain fog. He has a cough. They would like an inhaler called in for his lungs. Call: [phone]   
Please advise.  
The plaxlovid 5-day course antiviral for COVID-19 has been called in.  Please tell patient that he needs to be off of his cholesterol medication for 5 days when he is taking the Plaxlovid.    He can resume taking his cholesterol medication once the plaxlovid is complete  
60 year old female with PMHx HTN, HLD, DM, active smoker/ smokes half pack per day, recent travel to [place] ( last week ) admitted for syncope , fall at home with subsequent hamstrings injury rt leg. Patient reports 1 week of intermittent dizziness described as room spinning sensation, worse with raising up from leaning forward position. had syncopal episode yesterday after feeling dizzy when standing up. unsure if she struck her head. No AC use. Denies any chest pain, shortness of breath, palpitations, abdominal pain.  patient was initially admitted to observation. noted to have cardiac cta positive. now admitted inpatient  for cath , further w/u.

## 2023-11-10 NOTE — TELEPHONE ENCOUNTER
----- Message from Jorge Frost MD sent at 11/10/2023  7:38 AM EST -----  Please submit to vendor for new CPAP machine

## 2023-11-10 NOTE — TELEPHONE ENCOUNTER
Spoke to Laura to inform that this has alrealdy been sent to [facility] as of this morning.  Gave her the number to call and check with them on equipment.  She expressed appreciation.

## 2023-11-10 NOTE — TELEPHONE ENCOUNTER
Caller: Sindy Olvera    Relationship: Emergency Contact; [employer]    Best call back number: [phone]    What test was performed: AT-HOME SLEEP STUDY    When was the test performed: 11/4/2023    Where was the test performed: AT-HOME    Additional notes: PT'S WIFE CALLING FOR SLEEP STUDY AS THEY ARE ANXIOUS TO GET PT STARTED ON A NEW CPAP MACHINE.    PLEASE REVIEW AND ADVISE.

## 2023-12-13 DIAGNOSIS — G47.33 OSA (OBSTRUCTIVE SLEEP APNEA): Chronic | ICD-10-CM

## 2023-12-13 RX ORDER — MODAFINIL 200 MG/1
200 TABLET ORAL DAILY
Qty: 90 TABLET | Refills: 1 | Status: SHIPPED | OUTPATIENT
Start: 2023-12-13

## 2023-12-13 NOTE — TELEPHONE ENCOUNTER
Rx Refill Note  Requested Prescriptions     Pending Prescriptions Disp Refills    modafinil (PROVIGIL) 200 MG tablet [Pharmacy Med Name: MODAFINIL 200MG TABLETS] 90 tablet      Sig: TAKE 1 TABLET BY MOUTH DAILY      Last filled: 9/8/22 90 with 2 refills.  Last office visit with prescribing clinician: 10/23/23       Next office visit with prescribing clinician: Visit date not found     Lillian Hough MA  12/13/23, 11:12 EST

## 2023-12-22 ENCOUNTER — TELEPHONE (OUTPATIENT)
Dept: NEUROLOGY | Facility: CLINIC | Age: 58
End: 2023-12-22
Payer: COMMERCIAL

## 2023-12-22 DIAGNOSIS — G47.33 OSA (OBSTRUCTIVE SLEEP APNEA): Chronic | ICD-10-CM

## 2023-12-22 RX ORDER — MODAFINIL 200 MG/1
200 TABLET ORAL DAILY
Qty: 30 TABLET | Refills: 2 | Status: SHIPPED | OUTPATIENT
Start: 2023-12-22

## 2023-12-22 NOTE — TELEPHONE ENCOUNTER
Caller: Hasmukh Leon    Relationship: Self    Best call back number: [phone]    Preferred pharmacy: [facility] #[id number] [facility] [address] [facility]  AT [facility] - [phone] [facility] [phone] FX    What was the call regarding: PT HAS CALLED TO ASK IF THE MODAFINIL RX CAN BE CHANGED FROM QUANTITY 90 FOR 90 DAY SUPPLY TO QUANTITY 30 FOR 30 DAY SUPPLY W/ REFILLS. PT STATES HE DOES NOT USUALLY TAKE THE MEDICATION EVERY DAY, ONLY AS NEEDED. SO WHEN THE MEDICATION IS FILLED AS A 90-DAY SUPPLY, THE PRESCRIPTION ENDS UP EXPIRING BEFORE HE HAS FINISHED THE RX.    Do you require a callback: YES, PLEASE.    PLEASE REVIEW AND ADVISE.

## 2023-12-28 DIAGNOSIS — E78.5 HYPERLIPIDEMIA, UNSPECIFIED HYPERLIPIDEMIA TYPE: ICD-10-CM

## 2023-12-28 RX ORDER — ATORVASTATIN CALCIUM 10 MG/1
10 TABLET, FILM COATED ORAL DAILY
Qty: 30 TABLET | Refills: 0 | Status: SHIPPED | OUTPATIENT
Start: 2023-12-28

## 2023-12-28 NOTE — TELEPHONE ENCOUNTER
LOV 03/16/2023  NOV 01/31/2024    Spoke to patient's wife to schedule patient for a follow up. I refilled 1 months worth until patient comes in for his follow up.

## 2024-01-04 ENCOUNTER — PRIOR AUTHORIZATION (OUTPATIENT)
Dept: NEUROLOGY | Facility: CLINIC | Age: 59
End: 2024-01-04
Payer: COMMERCIAL

## 2024-01-04 NOTE — TELEPHONE ENCOUNTER
Key: [id number]  Modafinil 200MG tablets    Approved on January 3  PA Case: [id number], Status: Approved, Coverage Starts on: 1/3/2024 12:00:00 AM, Coverage Ends on: 1/2/2025 12:00:00 AM.

## 2024-01-25 DIAGNOSIS — E78.5 HYPERLIPIDEMIA, UNSPECIFIED HYPERLIPIDEMIA TYPE: ICD-10-CM

## 2024-01-25 NOTE — TELEPHONE ENCOUNTER
LOV 03/16/2023  NOV     Tried to reach patient no answer left voicemail to return call    RELAY: We recently received your message to refill your medication(s).  Our records indicate that you did not schedule a follow up appointment with your provider at your last visit on 03/16/2023. The medication that you are on requires a follow up appointment for additional refills. Patient was to schedule on or around 09/16/2023 for 6 month follow up   Please let us know what days/times work for you and we will be happy to set up an appointment.  You may also contact our office at [phone] to schedule your appointment.  If you are unable to keep your appointment, we will not be able to provide further refills.  Once you have scheduled your appointment, we will be happy to process your refill request.

## 2024-01-29 NOTE — TELEPHONE ENCOUNTER
2nd attempt     LOV 03/16/2023  NOV      Tried to reach patient no answer left voicemail to return call     RELAY: We recently received your message to refill your medication(s).  Our records indicate that you did not schedule a follow up appointment with your provider at your last visit on 03/16/2023. The medication that you are on requires a follow up appointment for additional refills. Patient was to schedule on or around 09/16/2023 for 6 month follow up   Please let us know what days/times work for you and we will be happy to set up an appointment.  You may also contact our office at [phone] to schedule your appointment.  If you are unable to keep your appointment, we will not be able to provide further refills.  Once you have scheduled your appointment, we will be happy to process your refill request.

## 2024-01-30 ENCOUNTER — PATIENT MESSAGE (OUTPATIENT)
Dept: INTERNAL MEDICINE | Facility: CLINIC | Age: 59
End: 2024-01-30
Payer: COMMERCIAL

## 2024-01-30 DIAGNOSIS — E78.5 HYPERLIPIDEMIA, UNSPECIFIED HYPERLIPIDEMIA TYPE: ICD-10-CM

## 2024-01-30 NOTE — TELEPHONE ENCOUNTER
3rd attempt     LOV 03/16/2023  NOV      Tried to reach patient no answer left voicemail to return call     RELAY: We recently received your message to refill your medication(s).  Our records indicate that you did not schedule a follow up appointment with your provider at your last visit on 03/16/2023. The medication that you are on requires a follow up appointment for additional refills. Patient was to schedule on or around 09/16/2023 for 6 month follow up   Please let us know what days/times work for you and we will be happy to set up an appointment.  You may also contact our office at [phone] to schedule your appointment.  If you are unable to keep your appointment, we will not be able to provide further refills.  Once you have scheduled your appointment, we will be happy to process your refill request.

## 2024-01-31 ENCOUNTER — TELEPHONE (OUTPATIENT)
Dept: INTERNAL MEDICINE | Facility: CLINIC | Age: 59
End: 2024-01-31

## 2024-01-31 DIAGNOSIS — E78.5 HYPERLIPIDEMIA, UNSPECIFIED HYPERLIPIDEMIA TYPE: ICD-10-CM

## 2024-01-31 RX ORDER — ATORVASTATIN CALCIUM 10 MG/1
10 TABLET, FILM COATED ORAL DAILY
Qty: 30 TABLET | Refills: 0 | OUTPATIENT
Start: 2024-01-31

## 2024-01-31 NOTE — TELEPHONE ENCOUNTER
----- Message from Felipe Morse MD sent at 1/30/2024  1:33 PM EST -----  Please tell patient that he needs to come in and have labs done for further refills.  Current cholesterol medication has been called in

## 2024-01-31 NOTE — TELEPHONE ENCOUNTER
Caller: [facility] #[id number] - [facility] [address]  AT [facility] - [phone]  - [phone] FX    Relationship: Pharmacy    Best call back number: [phone]    Requested Prescriptions:   Requested Prescriptions     Pending Prescriptions Disp Refills    atorvastatin (LIPITOR) 10 MG tablet 30 tablet 0     Sig: Take 1 tablet by mouth Daily.        Pharmacy where request should be sent: [facility] #[id number] - [place], [place] - [address] AT [address] [facility] - [phone]  - [phone] FX     Last office visit with prescribing clinician: 3/16/2023   Last telemedicine visit with prescribing clinician: Visit date not found   Next office visit with prescribing clinician: Visit date not found     Additional details provided by patient: PATIENT IS OUT OF MEDICATION     Does the patient have less than a 3 day supply:  [x] Yes  [] No    Would you like a call back once the refill request has been completed: [] Yes [x] No    If the office needs to give you a call back, can they leave a voicemail: [] Yes [x] No    Alfredito Gusman Rep   01/31/24 14:30 EST

## 2024-01-31 NOTE — TELEPHONE ENCOUNTER
From: Jaylin FARIAS  To: Annfranc Jorge Leon  Sent: 1/30/2024 9:58 AM EST  Subject: Med refill    We recently received your message to refill your medication(s). Our records indicate that you did not schedule a follow up appointment with your provider at your last visit on 03/16/2023. The medication that you are on requires a follow up appointment for additional refills. Patient was to schedule on or around 09/16/2023 for 6 month follow up   Please let us know what days/times work for you and we will be happy to set up an appointment. You may also contact our office at [phone] to schedule your appointment. If you are unable to keep your appointment, we will not be able to provide further refills. Once you have scheduled your appointment, we will be happy to process your refill request.

## 2024-02-01 NOTE — TELEPHONE ENCOUNTER
2nd attempt     Relay: Please tell patient that he needs to come in and have labs done for further refills.  Current cholesterol medication has been called in

## 2024-02-06 ENCOUNTER — TELEPHONE (OUTPATIENT)
Dept: INTERNAL MEDICINE | Facility: CLINIC | Age: 59
End: 2024-02-06
Payer: COMMERCIAL

## 2024-02-06 DIAGNOSIS — E78.5 HYPERLIPIDEMIA, UNSPECIFIED HYPERLIPIDEMIA TYPE: ICD-10-CM

## 2024-02-06 DIAGNOSIS — E29.1 HYPOGONADISM IN MALE: Primary | ICD-10-CM

## 2024-02-06 DIAGNOSIS — I10 PRIMARY HYPERTENSION: ICD-10-CM

## 2024-02-06 DIAGNOSIS — Z12.5 SCREENING PSA (PROSTATE SPECIFIC ANTIGEN): ICD-10-CM

## 2024-02-06 NOTE — TELEPHONE ENCOUNTER
Caller: Sindy Olvera    Relationship: Emergency Contact    Best call back number: [phone]    What is the best time to reach you: ANYTIME     Who are you requesting to speak with (clinical staff, provider,  specific staff member): CLINICAL STAFF    What was the call regarding: PATIENT'S WIFE IS CALLING TO SAY THAT THE PATIENT IS TIED UP AT WORK AND WILL NOT BE ABLE TO STOP BY THE OFFICE FOR LABS. THEY WOULD LIKE TO SEE IF HE CAN HAVE THESE LABS DONE AT THE [facility].     Is it okay if the provider responds through MyChart: YES

## 2024-02-07 NOTE — TELEPHONE ENCOUNTER
That is fine.  Please tell patient that I have put in the lab orders as future within the [facility] system and they should be able to go ahead and have the blood drawn at the [facility]

## 2024-02-07 NOTE — TELEPHONE ENCOUNTER
Tried to reach patient no answer left voicemail to return call    RELAY:    That is fine.  Please tell patient that I have put in the lab orders as future within the [facility] system and they should be able to go ahead and have the blood drawn at the [facility]

## 2024-02-08 RX ORDER — ATORVASTATIN CALCIUM 10 MG/1
10 TABLET, FILM COATED ORAL DAILY
Qty: 30 TABLET | Refills: 0 | OUTPATIENT
Start: 2024-02-08

## 2024-02-09 RX ORDER — ATORVASTATIN CALCIUM 10 MG/1
10 TABLET, FILM COATED ORAL DAILY
Qty: 90 TABLET | Refills: 0 | Status: SHIPPED | OUTPATIENT
Start: 2024-02-09

## 2024-03-05 ENCOUNTER — OFFICE VISIT (OUTPATIENT)
Dept: INTERNAL MEDICINE | Facility: CLINIC | Age: 59
End: 2024-03-05
Payer: COMMERCIAL

## 2024-03-05 VITALS
DIASTOLIC BLOOD PRESSURE: 74 MMHG | HEART RATE: 84 BPM | BODY MASS INDEX: 45.1 KG/M2 | TEMPERATURE: 97.8 F | HEIGHT: 70 IN | SYSTOLIC BLOOD PRESSURE: 122 MMHG | RESPIRATION RATE: 20 BRPM | WEIGHT: 315 LBS

## 2024-03-05 DIAGNOSIS — H60.391 OTHER INFECTIVE ACUTE OTITIS EXTERNA OF RIGHT EAR: Primary | ICD-10-CM

## 2024-03-05 PROCEDURE — 99213 OFFICE O/P EST LOW 20 MIN: CPT | Performed by: NURSE PRACTITIONER

## 2024-03-05 RX ORDER — CIPROFLOXACIN AND DEXAMETHASONE 3; 1 MG/ML; MG/ML
4 SUSPENSION/ DROPS AURICULAR (OTIC) 2 TIMES DAILY
Qty: 7.5 EACH | Refills: 0 | Status: SHIPPED | OUTPATIENT
Start: 2024-03-05

## 2024-03-05 NOTE — PROGRESS NOTES
Patient Name: Hasmukh Leon  : 1965   MRN: [id number]     Chief Complaint:    Chief Complaint   Patient presents with    Earache     Right ear pain x 3 days       History of Present Illness: Hasmukh Leon is a 58 y.o. male.  Hasmukh Leon is a 58-year-old female who presents for evaluation of right ear pain.    The patient reports his right ear is still bothering him, but it is slightly better today. He denies any fevers. He states that he has pain inside the ear canal. He denies any drainage or blood. He notes he has used a Q-tip carefully and nothing comes out. He reports yesterday, 2024, he could not hear almost all day. He denies any runny nose, cough or sinus pain.    The patient has no known allergies.      Subjective     Review of System: Review of Systems   Constitutional:  Negative for activity change, appetite change, chills, fatigue, fever and unexpected weight change.   HENT:  Positive for ear pain (inside right ear canal). Negative for congestion, postnasal drip, rhinorrhea, sinus pressure, sinus pain, sneezing and sore throat.    Eyes:  Negative for visual disturbance.   Respiratory:  Negative for cough, shortness of breath and wheezing.    Cardiovascular:  Negative for chest pain and palpitations.   Gastrointestinal:  Negative for abdominal pain, blood in stool, constipation, diarrhea, nausea and vomiting.   Genitourinary:  Negative for dysuria.   Musculoskeletal:  Negative for arthralgias, joint swelling and myalgias.   Skin:  Negative for rash.   Neurological:  Negative for dizziness, weakness and headaches.   Hematological:  Negative for adenopathy.   Psychiatric/Behavioral:  Negative for dysphoric mood. The patient is not nervous/anxious.       Medications:     Current Outpatient Medications:     albuterol sulfate  (90 Base) MCG/ACT inhaler, Inhale 2 puffs Every 6 (Six) Hours As Needed for Wheezing., Disp: 8 g, Rfl: 2    amLODIPine (NORVASC) 10 MG tablet, TAKE  "1 TABLET BY MOUTH DAILY, Disp: 90 tablet, Rfl: 2    atorvastatin (LIPITOR) 10 MG tablet, Take 1 tablet by mouth Daily., Disp: 90 tablet, Rfl: 0    desvenlafaxine (PRISTIQ) 100 MG 24 hr tablet, Take 1 tablet by mouth Daily., Disp: 90 tablet, Rfl: 1    hydroCHLOROthiazide (HYDRODIURIL) 12.5 MG tablet, TAKE 1 TABLET BY MOUTH DAILY, Disp: 90 tablet, Rfl: 2    modafinil (PROVIGIL) 200 MG tablet, Take 1 tablet by mouth Daily., Disp: 30 tablet, Rfl: 2    pantoprazole (PROTONIX) 40 MG EC tablet, Take 1 tablet by mouth Daily., Disp: 90 tablet, Rfl: 0    ciprofloxacin-dexAMETHasone (Ciprodex) 0.3-0.1 % otic suspension, Administer 4 drops to the right ear 2 (Two) Times a Day., Disp: 7.5 each, Rfl: 0    Allergies:   No Known Allergies    Objective     Physical Exam:   Vital Signs:   Vitals:    03/05/24 1548   BP: 122/74   BP Location: Right arm   Patient Position: Sitting   Cuff Size: Adult   Pulse: 84   Resp: 20   Temp: 97.8 °F (36.6 °C)   TempSrc: Infrared   Weight: (!) 157 kg (346 lb)   Height: 177.8 cm (70\")   PainSc:   1           Physical Exam  Constitutional:       General: He is not in acute distress.     Appearance: He is not ill-appearing.   HENT:      Head: Normocephalic.      Right Ear: Swelling (mild) present.      Ears:      Comments: Tenderness in tragus.  Cardiovascular:      Rate and Rhythm: Normal rate and regular rhythm.      Heart sounds: Normal heart sounds. No murmur heard.  Pulmonary:      Breath sounds: Normal breath sounds.   Abdominal:      General: Bowel sounds are normal.      Tenderness: There is no abdominal tenderness.   Neurological:      General: No focal deficit present.      Mental Status: He is oriented to person, place, and time.   Psychiatric:         Mood and Affect: Mood normal.       Assessment / Plan      Assessment/Plan:   Diagnoses and all orders for this visit:    1. Other infective acute otitis externa of right ear (Primary)  -     ciprofloxacin-dexAMETHasone (Ciprodex) 0.3-0.1 % " otic suspension; Administer 4 drops to the right ear 2 (Two) Times a Day.  Dispense: 7.5 each; Refill: 0       1. Otitis externa, right ear.  The patient's right ear canal is inflamed. The eardrum is not red or appear infected. I will prescribe an ear drop to use 4 drops in the morning and 4 drops at night for 1 week. He was instructed to roll the bottle in his hands and lie on his side for about 10 minutes and put a cotton ball in his ear. He was advised to keep the water out of the ear. If it gets worse or does not improve, he will let us know.    Follow Up:   Return if symptoms worsen or fail to improve.    DAVID Whipple  [facility]    Transcribed from ambient dictation for DAVID Whipple by Shyanne Farr.  03/05/24   17:15 EST    Patient or patient representative verbalized consent to the visit recording.  I have personally performed the services described in this document as transcribed by the above individual, and it is both accurate and complete.

## 2024-03-19 DIAGNOSIS — E55.9 VITAMIN D DEFICIENCY: ICD-10-CM

## 2024-03-19 RX ORDER — ERGOCALCIFEROL 1.25 MG/1
CAPSULE ORAL
Qty: 12 CAPSULE | Refills: 1 | Status: SHIPPED | OUTPATIENT
Start: 2024-03-19

## 2024-03-20 ENCOUNTER — TELEMEDICINE (OUTPATIENT)
Dept: NEUROLOGY | Facility: CLINIC | Age: 59
End: 2024-03-20
Payer: COMMERCIAL

## 2024-03-20 DIAGNOSIS — G47.33 OSA (OBSTRUCTIVE SLEEP APNEA): Primary | Chronic | ICD-10-CM

## 2024-03-20 DIAGNOSIS — G93.32 CHRONIC FATIGUE SYNDROME: Chronic | ICD-10-CM

## 2024-03-20 DIAGNOSIS — G47.61 PERIODIC LIMB MOVEMENT DISORDER: Chronic | ICD-10-CM

## 2024-03-20 PROCEDURE — 99214 OFFICE O/P EST MOD 30 MIN: CPT | Performed by: PSYCHIATRY & NEUROLOGY

## 2024-03-20 NOTE — PROGRESS NOTES
"Chief Complaint    AMRITA     Subjective        Hasmukh Leon presents to [facility] NEUROLOG[facility]    History of Present Illness    58 y.o. male returns in follow up.  Last visit on 10/23/23 continued CPAP and Provigil, ordered Home sleep study.      Home PSG 11/4/23 AHI 45.5     DME:  [employer]     Using new machine.     Compliant with CPAP and receiving benefit.       Provigil 100 mg 10:30 am daily and 100 mg qpm prn .         Changing mask and supplies on scheduled intervals..    Objective   Vital Signs:  There were no vitals taken for this visit.  Estimated body mass index is 49.65 kg/m² as calculated from the following:    Height as of 3/5/24: 177.8 cm (70\").    Weight as of 3/5/24: 157 kg (346 lb).           Neurologic Exam     Mental Status   Oriented to person, place, and time.   Level of consciousness: alert  Knowledge: good.     Cranial Nerves     CN III, IV, VI   Extraocular motions are normal.     CN VII   Facial expression full, symmetric.     CN VIII   CN VIII normal.     Motor Exam MAEW        Physical Exam  Eyes:      Extraocular Movements: EOM normal.   Neurological:      Mental Status: He is oriented to person, place, and time.        Result Review :                     Assessment and Plan     Diagnoses and all orders for this visit:    1. AMRITA (obstructive sleep apnea) (Primary)  Assessment & Plan:  Increase CPAP 10 - 18 cm H20     Orders:  -     PAP Therapy    2. Periodic limb movement disorder  Assessment & Plan:  Controlled       3. Chronic fatigue syndrome  Assessment & Plan:  Stable on provigidl                Follow Up     No follow-ups on file.  Patient was given instructions and counseling regarding his condition or for health maintenance advice. Please see specific information pulled into the AVS if appropriate.       You have chosen to receive care through a telehealth visit.  Do you consent to use a video/audio connection for your medical care today? Yes "     Patient at home    Physician at [facility]

## 2024-03-21 ENCOUNTER — TELEMEDICINE (OUTPATIENT)
Dept: INTERNAL MEDICINE | Facility: CLINIC | Age: 59
End: 2024-03-21
Payer: COMMERCIAL

## 2024-03-21 DIAGNOSIS — I10 PRIMARY HYPERTENSION: ICD-10-CM

## 2024-03-21 DIAGNOSIS — K21.9 GASTROESOPHAGEAL REFLUX DISEASE WITHOUT ESOPHAGITIS: ICD-10-CM

## 2024-03-21 DIAGNOSIS — E78.5 HYPERLIPIDEMIA, UNSPECIFIED HYPERLIPIDEMIA TYPE: ICD-10-CM

## 2024-03-21 PROCEDURE — 99214 OFFICE O/P EST MOD 30 MIN: CPT | Performed by: INTERNAL MEDICINE

## 2024-03-21 RX ORDER — ATORVASTATIN CALCIUM 10 MG/1
10 TABLET, FILM COATED ORAL DAILY
Qty: 90 TABLET | Refills: 2 | Status: SHIPPED | OUTPATIENT
Start: 2024-03-21

## 2024-03-21 RX ORDER — AMLODIPINE BESYLATE 10 MG/1
10 TABLET ORAL DAILY
Qty: 90 TABLET | Refills: 2 | Status: SHIPPED | OUTPATIENT
Start: 2024-03-21

## 2024-03-21 RX ORDER — PANTOPRAZOLE SODIUM 40 MG/1
40 TABLET, DELAYED RELEASE ORAL DAILY
Qty: 90 TABLET | Refills: 2 | Status: SHIPPED | OUTPATIENT
Start: 2024-03-21

## 2024-03-21 RX ORDER — HYDROCHLOROTHIAZIDE 12.5 MG/1
12.5 TABLET ORAL DAILY
Qty: 90 TABLET | Refills: 2 | Status: SHIPPED | OUTPATIENT
Start: 2024-03-21

## 2024-03-21 NOTE — PROGRESS NOTES
Chief Complaint  No chief complaint on file.    Subjective    Hasmukh Leon is a 58 y.o. male.     Hasmukh Leon presents to [facility] for       History of Present Illness    The following portions of the patient's history were reviewed and updated as appropriate: allergies, current medications, past family history, past medical history, past social history, past surgical history, and problem list.    Patient has consented for video MyChart  Location: work     1 HTN- chronic and controlled patient denies any other side effects from the medication and his blood pressure is controlled.    2 depression-chronic and stable.  Patient continues on the Pristiq 100 mg and this medication has been doing very well with his overall mental health.    3 GERD-patient continues on the pantoprazole and this medication has been doing very well with control of his GERD      Review of Systems   All other systems reviewed and are negative.      Objective   Vital Signs:   There were no vitals taken for this visit.    There is no height or weight on file to calculate BMI.  Class 3 Severe Obesity (BMI >=40). Obesity-related health conditions include the following: hypertension. Obesity is improving with lifestyle modifications. BMI is is above average; BMI management plan is completed. We discussed portion control and increasing exercise.     Physical Exam  Vitals and nursing note reviewed.   Constitutional:       Appearance: Normal appearance. He is normal weight.   HENT:      Head: Normocephalic and atraumatic.      Nose: Nose normal.      Mouth/Throat:      Mouth: Mucous membranes are moist.   Eyes:      Extraocular Movements: Extraocular movements intact.      Pupils: Pupils are equal, round, and reactive to light.   Cardiovascular:      Rate and Rhythm: Normal rate and regular rhythm.      Pulses: Normal pulses.      Heart sounds: Normal heart sounds.   Pulmonary:       Effort: Pulmonary effort is normal.      Breath sounds: Normal breath sounds.   Musculoskeletal:         General: Normal range of motion.      Cervical back: Normal range of motion and neck supple.   Skin:     General: Skin is warm.      Capillary Refill: Capillary refill takes less than 2 seconds.   Neurological:      Mental Status: He is alert.   Psychiatric:         Mood and Affect: Mood normal.         Behavior: Behavior normal.         Thought Content: Thought content normal.         Judgment: Judgment normal.               Assessment and Plan  Diagnoses and all orders for this visit:    Diagnoses and all orders for this visit:    Spent approximately 15 minutes face-to-face on video [facility]    1. Gastroesophageal reflux disease without esophagitis  -     pantoprazole (PROTONIX) 40 MG EC tablet; Take 1 tablet by mouth Daily.  Dispense: 90 tablet; Refill: 2    2. Primary hypertension  -     amLODIPine (NORVASC) 10 MG tablet; Take 1 tablet by mouth Daily.  Dispense: 90 tablet; Refill: 2  -     hydroCHLOROthiazide 12.5 MG tablet; Take 1 tablet by mouth Daily.  Dispense: 90 tablet; Refill: 2    3. Hyperlipidemia, unspecified hyperlipidemia type  -     atorvastatin (LIPITOR) 10 MG tablet; Take 1 tablet by mouth Daily.  Dispense: 90 tablet; Refill: 2              Follow Up   No follow-ups on file.  Patient was given instructions and counseling regarding his condition or for health maintenance advice. Please see specific information pulled into the AVS if appropriate.

## 2024-03-22 ENCOUNTER — LAB (OUTPATIENT)
Dept: INTERNAL MEDICINE | Facility: CLINIC | Age: 59
End: 2024-03-22
Payer: COMMERCIAL

## 2024-03-22 DIAGNOSIS — E29.1 HYPOGONADISM IN MALE: ICD-10-CM

## 2024-03-22 DIAGNOSIS — E78.5 HYPERLIPIDEMIA, UNSPECIFIED HYPERLIPIDEMIA TYPE: ICD-10-CM

## 2024-03-22 DIAGNOSIS — I10 PRIMARY HYPERTENSION: ICD-10-CM

## 2024-03-22 DIAGNOSIS — Z12.5 SCREENING PSA (PROSTATE SPECIFIC ANTIGEN): ICD-10-CM

## 2024-03-22 LAB
ALBUMIN SERPL-MCNC: 4.5 G/DL (ref 3.5–5.2)
ALBUMIN/CREATININE RATIO, URINE: <30
ALBUMIN/GLOB SERPL: 1.7 G/DL
ALP SERPL-CCNC: 99 U/L (ref 39–117)
ALT SERPL W P-5'-P-CCNC: 37 U/L (ref 1–41)
ANION GAP SERPL CALCULATED.3IONS-SCNC: 12 MMOL/L (ref 5–15)
AST SERPL-CCNC: 26 U/L (ref 1–40)
BILIRUB SERPL-MCNC: 0.3 MG/DL (ref 0–1.2)
BUN SERPL-MCNC: 13 MG/DL (ref 6–20)
BUN/CREAT SERPL: 12.3 (ref 7–25)
CALCIUM SPEC-SCNC: 9.7 MG/DL (ref 8.6–10.5)
CHLORIDE SERPL-SCNC: 101 MMOL/L (ref 98–107)
CHOLEST SERPL-MCNC: 164 MG/DL (ref 0–200)
CO2 SERPL-SCNC: 29 MMOL/L (ref 22–29)
CREAT SERPL-MCNC: 1.06 MG/DL (ref 0.76–1.27)
DEPRECATED RDW RBC AUTO: 40.5 FL (ref 37–54)
EGFRCR SERPLBLD CKD-EPI 2021: 81.3 ML/MIN/1.73
ERYTHROCYTE [DISTWIDTH] IN BLOOD BY AUTOMATED COUNT: 12.9 % (ref 12.3–15.4)
EXPIRATION DATE: NORMAL
GLOBULIN UR ELPH-MCNC: 2.6 GM/DL
GLUCOSE SERPL-MCNC: 102 MG/DL (ref 65–99)
HCT VFR BLD AUTO: 47.8 % (ref 37.5–51)
HDLC SERPL-MCNC: 38 MG/DL (ref 40–60)
HGB BLD-MCNC: 15.7 G/DL (ref 13–17.7)
LDLC SERPL CALC-MCNC: 88 MG/DL (ref 0–100)
LDLC/HDLC SERPL: 2.15 {RATIO}
Lab: NORMAL
MCH RBC QN AUTO: 28.5 PG (ref 26.6–33)
MCHC RBC AUTO-ENTMCNC: 32.8 G/DL (ref 31.5–35.7)
MCV RBC AUTO: 86.9 FL (ref 79–97)
PLATELET # BLD AUTO: 341 10*3/MM3 (ref 140–450)
PMV BLD AUTO: 9.8 FL (ref 6–12)
POC CREATININE URINE: 100
POC MICROALBUMIN URINE: 30
POTASSIUM SERPL-SCNC: 3.8 MMOL/L (ref 3.5–5.2)
PROT SERPL-MCNC: 7.1 G/DL (ref 6–8.5)
PSA SERPL-MCNC: 0.53 NG/ML (ref 0–4)
RBC # BLD AUTO: 5.5 10*6/MM3 (ref 4.14–5.8)
SODIUM SERPL-SCNC: 142 MMOL/L (ref 136–145)
T4 FREE SERPL-MCNC: 1.19 NG/DL (ref 0.93–1.7)
TRIGL SERPL-MCNC: 222 MG/DL (ref 0–150)
TSH SERPL DL<=0.05 MIU/L-ACNC: 2.08 UIU/ML (ref 0.27–4.2)
VLDLC SERPL-MCNC: 38 MG/DL (ref 5–40)
WBC NRBC COR # BLD AUTO: 6.27 10*3/MM3 (ref 3.4–10.8)

## 2024-03-22 PROCEDURE — 84439 ASSAY OF FREE THYROXINE: CPT | Performed by: INTERNAL MEDICINE

## 2024-03-22 PROCEDURE — 84403 ASSAY OF TOTAL TESTOSTERONE: CPT | Performed by: INTERNAL MEDICINE

## 2024-03-22 PROCEDURE — 82044 UR ALBUMIN SEMIQUANTITATIVE: CPT | Performed by: INTERNAL MEDICINE

## 2024-03-22 PROCEDURE — 80050 GENERAL HEALTH PANEL: CPT | Performed by: INTERNAL MEDICINE

## 2024-03-22 PROCEDURE — 80061 LIPID PANEL: CPT | Performed by: INTERNAL MEDICINE

## 2024-03-22 PROCEDURE — G0103 PSA SCREENING: HCPCS | Performed by: INTERNAL MEDICINE

## 2024-03-22 PROCEDURE — 84402 ASSAY OF FREE TESTOSTERONE: CPT | Performed by: INTERNAL MEDICINE

## 2024-03-25 DIAGNOSIS — K21.9 GASTROESOPHAGEAL REFLUX DISEASE WITHOUT ESOPHAGITIS: ICD-10-CM

## 2024-03-25 DIAGNOSIS — F32.A DEPRESSION, UNSPECIFIED DEPRESSION TYPE: ICD-10-CM

## 2024-03-25 NOTE — TELEPHONE ENCOUNTER
LOV 03/21/2024  NOV     Tried to reach patient no answer left voicemail to return call    RELAY:  We recently received your message to refill your medication(s).  Our records indicate that you did not schedule a follow up appointment with your provider at your last visit on 03/21/2024. The medication that you are on requires a follow up appointment for additional refills. Patient was to schedule on or around 01/01/2024 for Physical    If he is unable to keep his appointment we will not be able to provider further refills.  Once appointment has been scheduled please update message with date and time so we can process the request.  We will forward the message to the provider to review the refill request.

## 2024-03-26 LAB
TESTOST FREE SERPL-MCNC: 5 PG/ML (ref 7.2–24)
TESTOST SERPL-MCNC: 353 NG/DL (ref 264–916)

## 2024-03-26 NOTE — TELEPHONE ENCOUNTER
2nd attempt     LOV 03/21/2024  NOV      Tried to reach patient no answer left voicemail to return call     RELAY:  We recently received your message to refill your medication(s).  Our records indicate that you did not schedule a follow up appointment with your provider at your last visit on 03/21/2024. The medication that you are on requires a follow up appointment for additional refills. Patient was to schedule on or around 01/01/2024 for Physical     If he is unable to keep his appointment we will not be able to provider further refills.  Once appointment has been scheduled please update message with date and time so we can process the request.  We will forward the message to the provider to review the refill request

## 2024-03-27 RX ORDER — PANTOPRAZOLE SODIUM 40 MG/1
40 TABLET, DELAYED RELEASE ORAL DAILY
Qty: 90 TABLET | Refills: 2 | Status: SHIPPED | OUTPATIENT
Start: 2024-03-27

## 2024-03-27 RX ORDER — DESVENLAFAXINE 100 MG/1
100 TABLET, EXTENDED RELEASE ORAL DAILY
Qty: 90 TABLET | Refills: 2 | Status: SHIPPED | OUTPATIENT
Start: 2024-03-27

## 2024-03-27 NOTE — TELEPHONE ENCOUNTER
3rd attempt      LOV 03/21/2024  NOV      Tried to reach patient no answer left voicemail to return call     RELAY:  We recently received your message to refill your medication(s).  Our records indicate that you did not schedule a follow up appointment with your provider at your last visit on 03/21/2024. The medication that you are on requires a follow up appointment for additional refills. Patient was to schedule on or around 01/01/2024 for Physical     If he is unable to keep his appointment we will not be able to provider further refills.  Once appointment has been scheduled please update message with date and time so we can process the request.  We will forward the message to the provider to review the refill request

## 2024-04-08 ENCOUNTER — TELEPHONE (OUTPATIENT)
Dept: INTERNAL MEDICINE | Facility: CLINIC | Age: 59
End: 2024-04-08
Payer: COMMERCIAL

## 2024-04-11 NOTE — TELEPHONE ENCOUNTER
Reached mom, advised of message. Mom stated they will like the test to be ordered. Mom has Tachycardia.

## 2024-04-12 DIAGNOSIS — E78.5 HYPERLIPIDEMIA, UNSPECIFIED HYPERLIPIDEMIA TYPE: ICD-10-CM

## 2024-04-12 DIAGNOSIS — I51.9 HEART DISEASE: Primary | ICD-10-CM

## 2024-04-12 DIAGNOSIS — I10 PRIMARY HYPERTENSION: ICD-10-CM

## 2024-05-17 DIAGNOSIS — E78.5 HYPERLIPIDEMIA, UNSPECIFIED HYPERLIPIDEMIA TYPE: ICD-10-CM

## 2024-05-17 RX ORDER — ATORVASTATIN CALCIUM 10 MG/1
10 TABLET, FILM COATED ORAL DAILY
Qty: 90 TABLET | Refills: 2 | Status: SHIPPED | OUTPATIENT
Start: 2024-05-17

## 2024-06-28 ENCOUNTER — OFFICE VISIT (OUTPATIENT)
Dept: INTERNAL MEDICINE | Facility: CLINIC | Age: 59
End: 2024-06-28
Payer: COMMERCIAL

## 2024-06-28 VITALS
RESPIRATION RATE: 16 BRPM | HEART RATE: 88 BPM | BODY MASS INDEX: 45.1 KG/M2 | TEMPERATURE: 98 F | SYSTOLIC BLOOD PRESSURE: 126 MMHG | WEIGHT: 315 LBS | HEIGHT: 70 IN | DIASTOLIC BLOOD PRESSURE: 92 MMHG

## 2024-06-28 DIAGNOSIS — E78.5 HYPERLIPIDEMIA, UNSPECIFIED HYPERLIPIDEMIA TYPE: ICD-10-CM

## 2024-06-28 DIAGNOSIS — I10 PRIMARY HYPERTENSION: ICD-10-CM

## 2024-06-28 DIAGNOSIS — F32.A DEPRESSION, UNSPECIFIED DEPRESSION TYPE: ICD-10-CM

## 2024-06-28 DIAGNOSIS — Z00.00 WELL ADULT EXAM: Primary | ICD-10-CM

## 2024-06-28 PROCEDURE — 99396 PREV VISIT EST AGE 40-64: CPT | Performed by: INTERNAL MEDICINE

## 2024-06-28 NOTE — PROGRESS NOTES
"Complete Adult Physical  History of Present Illness  The patient presents for his annual physical.    The patient reports experiencing an involuntary need to take a deep breath, which is not associated with pain or discomfort. He recalls an episode of this symptom occurring while driving to the clinic.     Patient is wanting to take better control of his health and attempt to lose weight    Diet regular       Exercise:   Physical Exam  Patient is alert x3, in no distress.  Head is normocephalic and atraumatic. Pupils are equal to light and accommodation. Extraocular muscles are intact. Moist membranes.  Neck is supple. No cervical lymphadenopathy noted.  Chest is clear to auscultation. No rhonchi or wheeze.  Heart examination reveals S1, S2. No murmurs, rubs, or gallop.  Gastrointestinal examination reveals positive bowel sounds, soft, no mass, no tenderness.  Peripheral vascular examination reveals +2 pulses, warm extremity, good perfusion. Musculoskeletal examination reveals plus 5 out of 5 both upper and lower proximal distribution.  Cranial nerves intact, +2 DTRs.       Vital Signs:   /92 (BP Location: Right arm, Patient Position: Sitting, Cuff Size: Large Adult)   Pulse 88   Temp 98 °F (36.7 °C) (Temporal)   Resp 16   Ht 177.3 cm (69.8\")   Wt (!) 157 kg (345 lb 4 oz)   BMI 49.82 kg/m²       Body mass index is 49.82 kg/m².      Social History: no smoking, no etoh, no drugs       Preventative Screenings  Colonoscopy       Results           Immunizations:    Assessment & Plan  1. Obesity and weight management.  A comprehensive discussion was held with the patient regarding his obesity and weight management. Consequently, he will explore various exercise programs with a  and dietary modifications. An emphasis was placed on the various alternatives such as GLP-1 injections such as Wegovy and Zepbound, along with potential bariatric surgery. Subsequently, I will provide him with additional " resources to research and explore.    2. Hypertension.  The patient's blood pressure is well-managed. The current medication regimen will be continued.    3. Depression.  He will persist with Pristiq 100 mg, one tablet orally once daily.    4. Anticipatory guidance and screenings.  He is current with his screenings. Although he does not recall the status of his recent colonoscopy, he was encouraged to verify this report or contact the insurance company to inquire about the claim and the location of the provider and agency who performed the colonoscopy. His laboratory tests have already been conducted, thus medical management will be continued at this time.    Follow-up  The patient is scheduled for a physical examination in 1 year. However, due to the concerns of obesity and weight management, I will be looking forward to the future dialogue as he is motivated to change, particularly with the dietary and trainers, and we will continue medical management from that point.     Diagnoses and all orders for this visit:    1. Well adult exam (Primary)    2. Primary hypertension    3. Hyperlipidemia, unspecified hyperlipidemia type    4. Depression, unspecified depression type          Patient or patient representative verbalized consent for the use of Ambient Listening during the visit with  Felipe Morse MD for chart documentation. 6/28/2024  13:02 EDT

## 2024-07-10 DIAGNOSIS — G47.33 OSA (OBSTRUCTIVE SLEEP APNEA): Chronic | ICD-10-CM

## 2024-07-10 RX ORDER — MODAFINIL 200 MG/1
200 TABLET ORAL DAILY
Qty: 30 TABLET | Refills: 2 | Status: SHIPPED | OUTPATIENT
Start: 2024-07-10

## 2024-07-10 NOTE — TELEPHONE ENCOUNTER
Caller: Sindy Olvera    Relationship: Emergency Contact    Best call back number: [phone]    Requested Prescriptions:   Requested Prescriptions     Pending Prescriptions Disp Refills    modafinil (PROVIGIL) 200 MG tablet 30 tablet 2     Sig: Take 1 tablet by mouth Daily.        Pharmacy where request should be sent: [id number] - [facility] [address]  AT [facility] [phone] [facility] [phone]      Last office visit with prescribing clinician: Visit date not found   Last telemedicine visit with prescribing clinician: 3/20/2024   Next office visit with prescribing clinician: Visit date not found     Additional details provided by patient: PT IS COMPLETELY OUT OF MEDICATION.     Does the patient have less than a 3 day supply:  [x] Yes  [] No    Would you like a call back once the refill request has been completed: [] Yes [x] No    If the office needs to give you a call back, can they leave a voicemail: [] Yes [x] No    Alfredito Sotelo Rep   07/10/24 14:10 EDT

## 2024-07-10 NOTE — TELEPHONE ENCOUNTER
Last in person visit was 09/25/2020     Rx Refill Note  Requested Prescriptions     Pending Prescriptions Disp Refills    modafinil (PROVIGIL) 200 MG tablet 30 tablet 2     Sig: Take 1 tablet by mouth Daily.      Last filled:  12/22/2023 w/2  Last office visit with prescribing clinician: 03/20/24 telehealth     Next office visit with prescribing clinician: 03/21/2025     Rema Gallagher MA  07/10/24, 14:27 EDT

## 2024-07-12 ENCOUNTER — HOSPITAL ENCOUNTER (OUTPATIENT)
Facility: HOSPITAL | Age: 59
Discharge: HOME | End: 2024-07-12
Admitting: INTERNAL MEDICINE
Payer: COMMERCIAL

## 2024-07-12 DIAGNOSIS — E78.5 HYPERLIPIDEMIA, UNSPECIFIED HYPERLIPIDEMIA TYPE: ICD-10-CM

## 2024-07-12 DIAGNOSIS — I51.9 HEART DISEASE: ICD-10-CM

## 2024-07-12 DIAGNOSIS — I10 PRIMARY HYPERTENSION: ICD-10-CM

## 2024-07-12 PROCEDURE — 75571 CT HRT W/O DYE W/CA TEST: CPT

## 2024-08-31 DIAGNOSIS — E55.9 VITAMIN D DEFICIENCY: ICD-10-CM

## 2024-09-03 RX ORDER — ERGOCALCIFEROL 1.25 MG/1
CAPSULE, LIQUID FILLED ORAL
Qty: 12 CAPSULE | Refills: 1 | Status: SHIPPED | OUTPATIENT
Start: 2024-09-03

## 2024-12-06 DIAGNOSIS — F32.A DEPRESSION, UNSPECIFIED DEPRESSION TYPE: ICD-10-CM

## 2024-12-06 DIAGNOSIS — I10 PRIMARY HYPERTENSION: ICD-10-CM

## 2024-12-06 DIAGNOSIS — K21.9 GASTROESOPHAGEAL REFLUX DISEASE WITHOUT ESOPHAGITIS: ICD-10-CM

## 2024-12-06 RX ORDER — AMLODIPINE BESYLATE 10 MG/1
10 TABLET ORAL DAILY
Qty: 90 TABLET | Refills: 2 | Status: SHIPPED | OUTPATIENT
Start: 2024-12-06

## 2024-12-06 RX ORDER — DESVENLAFAXINE 100 MG/1
100 TABLET, EXTENDED RELEASE ORAL DAILY
Qty: 90 TABLET | Refills: 2 | Status: SHIPPED | OUTPATIENT
Start: 2024-12-06

## 2024-12-06 RX ORDER — HYDROCHLOROTHIAZIDE 12.5 MG/1
12.5 TABLET ORAL DAILY
Qty: 90 TABLET | Refills: 2 | Status: SHIPPED | OUTPATIENT
Start: 2024-12-06

## 2024-12-06 RX ORDER — PANTOPRAZOLE SODIUM 40 MG/1
40 TABLET, DELAYED RELEASE ORAL DAILY
Qty: 90 TABLET | Refills: 2 | Status: SHIPPED | OUTPATIENT
Start: 2024-12-06

## 2025-02-11 DIAGNOSIS — F32.A DEPRESSION, UNSPECIFIED DEPRESSION TYPE: ICD-10-CM

## 2025-02-11 DIAGNOSIS — K21.9 GASTROESOPHAGEAL REFLUX DISEASE WITHOUT ESOPHAGITIS: ICD-10-CM

## 2025-02-11 RX ORDER — DESVENLAFAXINE 100 MG/1
100 TABLET, EXTENDED RELEASE ORAL DAILY
Qty: 90 TABLET | Refills: 2 | Status: SHIPPED | OUTPATIENT
Start: 2025-02-11

## 2025-02-11 RX ORDER — PANTOPRAZOLE SODIUM 40 MG/1
40 TABLET, DELAYED RELEASE ORAL DAILY
Qty: 90 TABLET | Refills: 2 | Status: SHIPPED | OUTPATIENT
Start: 2025-02-11

## 2025-02-17 DIAGNOSIS — G47.33 OSA (OBSTRUCTIVE SLEEP APNEA): Chronic | ICD-10-CM

## 2025-02-17 RX ORDER — MODAFINIL 200 MG/1
200 TABLET ORAL DAILY
Qty: 90 TABLET | Refills: 1 | Status: SHIPPED | OUTPATIENT
Start: 2025-02-17

## 2025-02-17 NOTE — TELEPHONE ENCOUNTER
Rx Refill Note  Requested Prescriptions     Pending Prescriptions Disp Refills    modafinil (PROVIGIL) 200 MG tablet [Pharmacy Med Name: MODAFINIL 200MG TABLETS] 30 tablet      Sig: TAKE 1 TABLET BY MOUTH DAILY      Last filled:7.10.24 with 2 RF pending to [facility] for approval  Last office visit with prescribing clinician: Visit date not found      Next office visit with prescribing clinician: 3/21/2025     Hortensia Gutierrez MA  02/17/25, 10:50 EST    Down to ONE TABLET!

## 2025-02-17 NOTE — TELEPHONE ENCOUNTER
MEDICATION REFILL REQUEST    Caller: Hasmukh Leon Jorge    Relationship: Self    Best call back number: [phone]    Requested Prescriptions:   Requested Prescriptions     Pending Prescriptions Disp Refills    modafinil (PROVIGIL) 200 MG tablet [Pharmacy Med Name: MODAFINIL 200MG TABLETS] 30 tablet      Sig: TAKE 1 TABLET BY MOUTH DAILY      Pharmacy where request should be sent: [facility] #[id number] - [place], [place] - [address]  AT [facility] [phone] [facility] [phone]      Last office visit with prescribing clinician: Visit date not found   Last telemedicine visit with prescribing clinician: Visit date not found   Next office visit with prescribing clinician: 3/21/2025     Additional details provided by patient: PT REQUESTS THAT RX BE SENT OVER FOR AT LEAST A 30-DAY SUPPLY.    PT REPORTS HE HAS JUST ONE TABLET OF THE MODAFINIL LEFT.    Does the patient have less than a 3 day supply?:  [x] Yes  [] No    Would you like a call back once the refill request has been completed?: [] Yes  [x] No    If the office needs to give you a call back, can they leave a voicemail?: [] Yes  [x] No    PLEASE REVIEW AND ADVISE.    Alfredito Corey Rep   02/17/25 10:50 EST

## 2025-06-14 DIAGNOSIS — E78.5 HYPERLIPIDEMIA, UNSPECIFIED HYPERLIPIDEMIA TYPE: ICD-10-CM

## 2025-06-16 RX ORDER — ATORVASTATIN CALCIUM 10 MG/1
10 TABLET, FILM COATED ORAL DAILY
Qty: 90 TABLET | Refills: 2 | Status: SHIPPED | OUTPATIENT
Start: 2025-06-16

## 2025-06-20 ENCOUNTER — OFFICE VISIT (OUTPATIENT)
Dept: INTERNAL MEDICINE | Facility: CLINIC | Age: 60
End: 2025-06-20
Payer: COMMERCIAL

## 2025-06-20 VITALS
DIASTOLIC BLOOD PRESSURE: 84 MMHG | HEART RATE: 74 BPM | WEIGHT: 297 LBS | TEMPERATURE: 98.2 F | BODY MASS INDEX: 42.86 KG/M2 | SYSTOLIC BLOOD PRESSURE: 140 MMHG | RESPIRATION RATE: 18 BRPM

## 2025-06-20 DIAGNOSIS — H60.391 OTHER INFECTIVE ACUTE OTITIS EXTERNA OF RIGHT EAR: Primary | ICD-10-CM

## 2025-06-20 PROCEDURE — 99213 OFFICE O/P EST LOW 20 MIN: CPT | Performed by: STUDENT IN AN ORGANIZED HEALTH CARE EDUCATION/TRAINING PROGRAM

## 2025-06-20 RX ORDER — CIPROFLOXACIN AND DEXAMETHASONE 3; 1 MG/ML; MG/ML
4 SUSPENSION/ DROPS AURICULAR (OTIC) 2 TIMES DAILY
Qty: 7.5 ML | Refills: 0 | Status: SHIPPED | OUTPATIENT
Start: 2025-06-20 | End: 2025-06-27

## 2025-06-20 NOTE — PROGRESS NOTES
Acute Office Visit      Date: 2025   Patient Name: Hasmukh Leon  : 1965   MRN: [id number]     Chief Complaint:    Chief Complaint   Patient presents with    Earache     Rt       History of Present Illness: Hasmukh Leon is a 59 y.o. male.    History of Present Illness  The patient is a 59-year-old male who presents for an acute sick visit.    Ear Pain and Blockage  He experienced ear pain a few days ago, which was exacerbated by chewing and touch. This is not his first encounter with such symptoms. Currently, the pain has subsided, but he reports a sensation of blockage in the right ear. He had hoped the issue would resolve spontaneously, but as it persisted until Friday, he decided to seek medical attention. He recalls using ear drops for a similar issue last year. He reports no fever, congestion, cough, or respiratory difficulties.  - Onset: A few days ago.  - Location: Right ear.  - Duration: Persistent until Friday.  - Character: Pain exacerbated by chewing and touch; sensation of blockage.  - Alleviating/Aggravating Factors: Pain has subsided; used ear drops last year for similar issue.  - Severity: Pain has subsided; sensation of blockage persists.    Earbud Use and Ear Cleaning Practices  He mentions that he uses earbuds during sleep and is concerned about their potential contribution to his ear issues. He has been avoiding using them in the affected ear. He is seeking advice on whether the prescribed drops can be used prophylactically to prevent future episodes while continuing to use earbuds. He also questions the appropriateness of using Q-tips for ear cleaning, a practice he regularly follows. He has never had any issues with ear wax accumulation.        Subjective      Review of Systems:   Review of Systems   All other systems reviewed and are negative.      I have reviewed the patients family history, social history, past medical history, past surgical history and have  updated it as appropriate.     Medications:     Current Outpatient Medications:     amLODIPine (NORVASC) 10 MG tablet, TAKE 1 TABLET BY MOUTH DAILY, Disp: 90 tablet, Rfl: 2    atorvastatin (LIPITOR) 10 MG tablet, TAKE 1 TABLET BY MOUTH DAILY, Disp: 90 tablet, Rfl: 2    desvenlafaxine (PRISTIQ) 100 MG 24 hr tablet, TAKE 1 TABLET BY MOUTH DAILY, Disp: 90 tablet, Rfl: 2    hydroCHLOROthiazide 12.5 MG tablet, TAKE 1 TABLET BY MOUTH DAILY, Disp: 90 tablet, Rfl: 2    modafinil (PROVIGIL) 200 MG tablet, TAKE 1 TABLET BY MOUTH DAILY, Disp: 90 tablet, Rfl: 1    pantoprazole (PROTONIX) 40 MG EC tablet, TAKE 1 TABLET BY MOUTH DAILY, Disp: 90 tablet, Rfl: 2    vitamin D (ERGOCALCIFEROL) 1.25 MG (93686 UT) capsule capsule, TAKE 1 CAPSULE BY MOUTH 1 TIME EVERY WEEK, Disp: 12 capsule, Rfl: 1    ciprofloxacin-dexAMETHasone (Ciprodex) 0.3-0.1 % otic suspension, Administer 4 drops to the right ear 2 (Two) Times a Day for 7 days., Disp: 7.5 mL, Rfl: 0    Allergies:   No Known Allergies    Objective     Physical Exam: Please see above  Vital Signs:   Vitals:    06/20/25 1328   BP: 140/84   BP Location: Right arm   Patient Position: Sitting   Cuff Size: Large Adult   Pulse: 74   Resp: 18   Temp: 98.2 °F (36.8 °C)   TempSrc: Temporal   Weight: 135 kg (297 lb)   PainSc: 2    PainLoc: Ear     Body mass index is 42.86 kg/m².    Physical Exam  Vitals and nursing note reviewed.   Constitutional:       General: He is not in acute distress.     Appearance: Normal appearance. He is normal weight. He is not ill-appearing or toxic-appearing.   HENT:      Right Ear: Tenderness present. No drainage. Tympanic membrane is erythematous. Tympanic membrane is not bulging.      Left Ear: No drainage or tenderness. Tympanic membrane is not erythematous or bulging.      Nose: No congestion or rhinorrhea.   Eyes:      General:         Right eye: No discharge.         Left eye: No discharge.      Conjunctiva/sclera: Conjunctivae normal.   Pulmonary:       Effort: Pulmonary effort is normal. No respiratory distress.   Abdominal:      General: Abdomen is flat. There is no distension.   Musculoskeletal:      Cervical back: Normal range of motion.   Skin:     Coloration: Skin is not jaundiced.      Findings: No rash.   Neurological:      General: No focal deficit present.      Mental Status: He is alert. Mental status is at baseline.      Coordination: Coordination normal.      Gait: Gait normal.   Psychiatric:         Mood and Affect: Mood normal.         Behavior: Behavior normal.         Thought Content: Thought content normal.         Judgment: Judgment normal.         Procedures    Results:   Labs:   TSH   Date Value Ref Range Status   03/22/2024 2.080 0.270 - 4.200 uIU/mL Final        Imaging:   No valid procedures specified.     Assessment / Plan      Assessment/Plan:   Problem List Items Addressed This Visit    None  Visit Diagnoses         Other infective acute otitis externa of right ear    -  Primary    Relevant Medications    ciprofloxacin-dexAMETHasone (Ciprodex) 0.3-0.1 % otic suspension            Assessment & Plan  1. Otitis externa  - Symptoms include inflammation along the external ear canal with no evidence of an inner ear infection  - Physical exam revealed a slightly inflamed canal on the right side  - Discussed discontinuing the use of Q-tips  - Recommended Debrox or sweet oil drops for long-term wax management  - Prescribed Ciprodex drops to be used for one week  - Instructions to keep remaining drops for future use if symptoms recur  - If symptoms persist, affect hearing or balance, or if new symptoms such as fevers or pressure develop, return for further evaluation    Follow-up  - Patient will follow up with Dr. Morse in 08/2025 for annual physical    Results         Follow Up:   Return in about 10 weeks (around 8/29/2025) for Next scheduled follow up, Annual physical.    Patient or patient representative verbalized consent for the use of  Ambient Listening during the visit with  Robbin Navarro MD for chart documentation. 6/20/2025  16:43 EDT        Robbin Navarro MD  [facility] – [place], [place] [facility]

## 2025-08-29 ENCOUNTER — PATIENT MESSAGE (OUTPATIENT)
Dept: INTERNAL MEDICINE | Facility: CLINIC | Age: 60
End: 2025-08-29

## 2025-08-29 ENCOUNTER — OFFICE VISIT (OUTPATIENT)
Dept: INTERNAL MEDICINE | Facility: CLINIC | Age: 60
End: 2025-08-29
Payer: COMMERCIAL

## 2025-08-29 ENCOUNTER — OFFICE VISIT (OUTPATIENT)
Facility: HOSPITAL | Age: 60
End: 2025-08-29
Payer: COMMERCIAL

## 2025-08-29 VITALS
HEIGHT: 70 IN | BODY MASS INDEX: 41.03 KG/M2 | WEIGHT: 286.6 LBS | OXYGEN SATURATION: 96 % | DIASTOLIC BLOOD PRESSURE: 76 MMHG | HEART RATE: 68 BPM | SYSTOLIC BLOOD PRESSURE: 118 MMHG

## 2025-08-29 VITALS
BODY MASS INDEX: 41.09 KG/M2 | TEMPERATURE: 97.4 F | OXYGEN SATURATION: 96 % | WEIGHT: 287 LBS | HEIGHT: 70 IN | DIASTOLIC BLOOD PRESSURE: 78 MMHG | HEART RATE: 69 BPM | SYSTOLIC BLOOD PRESSURE: 132 MMHG

## 2025-08-29 DIAGNOSIS — E78.5 HYPERLIPIDEMIA, UNSPECIFIED HYPERLIPIDEMIA TYPE: ICD-10-CM

## 2025-08-29 DIAGNOSIS — R20.0 NUMBNESS OF RIGHT HAND: ICD-10-CM

## 2025-08-29 DIAGNOSIS — G47.33 OSA (OBSTRUCTIVE SLEEP APNEA): Primary | Chronic | ICD-10-CM

## 2025-08-29 DIAGNOSIS — Z23 NEED FOR PROPHYLACTIC VACCINATION AGAINST STREPTOCOCCUS PNEUMONIAE (PNEUMOCOCCUS): ICD-10-CM

## 2025-08-29 DIAGNOSIS — G47.33 OSA (OBSTRUCTIVE SLEEP APNEA): Chronic | ICD-10-CM

## 2025-08-29 DIAGNOSIS — Z12.5 SCREENING PSA (PROSTATE SPECIFIC ANTIGEN): ICD-10-CM

## 2025-08-29 DIAGNOSIS — G47.61 PERIODIC LIMB MOVEMENT DISORDER: Chronic | ICD-10-CM

## 2025-08-29 DIAGNOSIS — F32.A DEPRESSION, UNSPECIFIED DEPRESSION TYPE: Chronic | ICD-10-CM

## 2025-08-29 DIAGNOSIS — G47.10 HYPERSOMNIA WITH SLEEP APNEA: ICD-10-CM

## 2025-08-29 DIAGNOSIS — Z00.00 WELL ADULT EXAM: Primary | ICD-10-CM

## 2025-08-29 DIAGNOSIS — K21.9 GASTROESOPHAGEAL REFLUX DISEASE WITHOUT ESOPHAGITIS: ICD-10-CM

## 2025-08-29 DIAGNOSIS — F32.A DEPRESSION, UNSPECIFIED DEPRESSION TYPE: ICD-10-CM

## 2025-08-29 DIAGNOSIS — I10 PRIMARY HYPERTENSION: ICD-10-CM

## 2025-08-29 DIAGNOSIS — G47.30 HYPERSOMNIA WITH SLEEP APNEA: ICD-10-CM

## 2025-08-29 LAB
ALBUMIN SERPL-MCNC: 4.6 G/DL (ref 3.5–5.2)
ALBUMIN/GLOB SERPL: 1.5 G/DL
ALP SERPL-CCNC: 93 U/L (ref 39–117)
ALT SERPL W P-5'-P-CCNC: 20 U/L (ref 1–41)
ANION GAP SERPL CALCULATED.3IONS-SCNC: 16.4 MMOL/L (ref 5–15)
AST SERPL-CCNC: 25 U/L (ref 1–40)
BILIRUB SERPL-MCNC: 0.5 MG/DL (ref 0–1.2)
BUN SERPL-MCNC: 14 MG/DL (ref 6–20)
BUN/CREAT SERPL: 13.9 (ref 7–25)
CALCIUM SPEC-SCNC: 9.9 MG/DL (ref 8.6–10.5)
CHLORIDE SERPL-SCNC: 100 MMOL/L (ref 98–107)
CHOLEST SERPL-MCNC: 171 MG/DL (ref 0–200)
CO2 SERPL-SCNC: 26.6 MMOL/L (ref 22–29)
CREAT SERPL-MCNC: 1.01 MG/DL (ref 0.76–1.27)
DEPRECATED RDW RBC AUTO: 41.8 FL (ref 37–54)
EGFRCR SERPLBLD CKD-EPI 2021: 85.7 ML/MIN/1.73
ERYTHROCYTE [DISTWIDTH] IN BLOOD BY AUTOMATED COUNT: 13.1 % (ref 12.3–15.4)
GLOBULIN UR ELPH-MCNC: 3 GM/DL
GLUCOSE SERPL-MCNC: 94 MG/DL (ref 65–99)
HCT VFR BLD AUTO: 47.9 % (ref 37.5–51)
HDLC SERPL-MCNC: 39 MG/DL (ref 40–60)
HGB BLD-MCNC: 16 G/DL (ref 13–17.7)
LDLC SERPL CALC-MCNC: 100 MG/DL (ref 0–100)
LDLC/HDLC SERPL: 2.43 {RATIO}
MCH RBC QN AUTO: 29.5 PG (ref 26.6–33)
MCHC RBC AUTO-ENTMCNC: 33.4 G/DL (ref 31.5–35.7)
MCV RBC AUTO: 88.2 FL (ref 79–97)
PLATELET # BLD AUTO: 333 10*3/MM3 (ref 140–450)
PMV BLD AUTO: 9.9 FL (ref 6–12)
POTASSIUM SERPL-SCNC: 3.9 MMOL/L (ref 3.5–5.2)
PROT SERPL-MCNC: 7.6 G/DL (ref 6–8.5)
PSA SERPL-MCNC: 0.47 NG/ML (ref 0–4)
RBC # BLD AUTO: 5.43 10*6/MM3 (ref 4.14–5.8)
SODIUM SERPL-SCNC: 143 MMOL/L (ref 136–145)
T4 FREE SERPL-MCNC: 1.11 NG/DL (ref 0.92–1.68)
TRIGL SERPL-MCNC: 186 MG/DL (ref 0–150)
TSH SERPL DL<=0.05 MIU/L-ACNC: 1.76 UIU/ML (ref 0.27–4.2)
VLDLC SERPL-MCNC: 32 MG/DL (ref 5–40)
WBC NRBC COR # BLD AUTO: 5.09 10*3/MM3 (ref 3.4–10.8)

## 2025-08-29 PROCEDURE — 84439 ASSAY OF FREE THYROXINE: CPT | Performed by: INTERNAL MEDICINE

## 2025-08-29 PROCEDURE — G0463 HOSPITAL OUTPT CLINIC VISIT: HCPCS | Performed by: PSYCHIATRY & NEUROLOGY

## 2025-08-29 PROCEDURE — G0103 PSA SCREENING: HCPCS | Performed by: INTERNAL MEDICINE

## 2025-08-29 PROCEDURE — 83695 ASSAY OF LIPOPROTEIN(A): CPT | Performed by: INTERNAL MEDICINE

## 2025-08-29 PROCEDURE — 80061 LIPID PANEL: CPT | Performed by: INTERNAL MEDICINE

## 2025-08-29 PROCEDURE — 80050 GENERAL HEALTH PANEL: CPT | Performed by: INTERNAL MEDICINE

## 2025-08-29 RX ORDER — MODAFINIL 200 MG/1
200 TABLET ORAL DAILY
Qty: 90 TABLET | Refills: 2 | Status: SHIPPED | OUTPATIENT
Start: 2025-08-29 | End: 2025-08-29 | Stop reason: SDUPTHER

## 2025-08-29 RX ORDER — MODAFINIL 200 MG/1
200 TABLET ORAL DAILY
Qty: 90 TABLET | Refills: 1 | Status: SHIPPED | OUTPATIENT
Start: 2025-08-29

## 2025-08-29 RX ORDER — AMLODIPINE BESYLATE 10 MG/1
10 TABLET ORAL DAILY
Qty: 90 TABLET | Refills: 2 | Status: SHIPPED | OUTPATIENT
Start: 2025-08-29

## 2025-08-29 RX ORDER — PANTOPRAZOLE SODIUM 40 MG/1
40 TABLET, DELAYED RELEASE ORAL DAILY
Qty: 90 TABLET | Refills: 2 | Status: SHIPPED | OUTPATIENT
Start: 2025-08-29

## 2025-08-29 RX ORDER — DESVENLAFAXINE 50 MG/1
50 TABLET, FILM COATED, EXTENDED RELEASE ORAL DAILY
Qty: 30 TABLET | Refills: 3 | Status: SHIPPED | OUTPATIENT
Start: 2025-08-29